# Patient Record
Sex: FEMALE | Race: WHITE | NOT HISPANIC OR LATINO | Employment: FULL TIME | URBAN - METROPOLITAN AREA
[De-identification: names, ages, dates, MRNs, and addresses within clinical notes are randomized per-mention and may not be internally consistent; named-entity substitution may affect disease eponyms.]

---

## 2023-02-24 LAB
CHLAMYDIA TRACH., AMPLIFIED: POSITIVE
HEPATITIS C VIRUS AB PRESENCE IN SERUM: NONREACTIVE
HERPES SIMPLEX VIRUS 1 IGG: <0.2 INDEX
HERPES SIMPLEX VIRUS 2 IGG: <0.2 INDEX
HIV 1/ 2 AG/AB SCREEN: NONREACTIVE
N. GONORRHEA, AMPLIFIED: NEGATIVE
SYPHILIS TOTAL AB: NONREACTIVE
TRICHOMONAS VAGINALIS: NEGATIVE

## 2023-04-12 ENCOUNTER — TELEPHONE (OUTPATIENT)
Dept: PRIMARY CARE | Facility: CLINIC | Age: 28
End: 2023-04-12
Payer: COMMERCIAL

## 2023-04-12 DIAGNOSIS — A74.9 CHLAMYDIA: Primary | ICD-10-CM

## 2023-04-12 NOTE — TELEPHONE ENCOUNTER
Pt seen Feb 23 and we did an STD check, pt was positive for chlamydia and we wanted her to leave another urine sample in 4 weeks. Which urine test needs to be placed? I don't see a urine test from Feb just blood work.

## 2023-04-24 ENCOUNTER — LAB (OUTPATIENT)
Dept: LAB | Facility: LAB | Age: 28
End: 2023-04-24
Payer: COMMERCIAL

## 2023-04-24 DIAGNOSIS — A74.9 CHLAMYDIA: ICD-10-CM

## 2023-04-24 PROCEDURE — 87591 N.GONORRHOEAE DNA AMP PROB: CPT

## 2023-04-24 PROCEDURE — 87491 CHLMYD TRACH DNA AMP PROBE: CPT

## 2023-04-25 ENCOUNTER — TELEPHONE (OUTPATIENT)
Dept: PRIMARY CARE | Facility: CLINIC | Age: 28
End: 2023-04-25
Payer: COMMERCIAL

## 2023-04-25 LAB
CHLAMYDIA TRACH., AMPLIFIED: NEGATIVE
N. GONORRHEA, AMPLIFIED: NEGATIVE

## 2023-04-25 NOTE — TELEPHONE ENCOUNTER
----- Message from April Lopes DO sent at 4/25/2023 12:23 PM EDT -----  Gonorrhea and Chlamydia are both negative    It appears the patient was fully treated appropriately

## 2023-11-27 ENCOUNTER — OFFICE VISIT (OUTPATIENT)
Dept: OBSTETRICS AND GYNECOLOGY | Facility: CLINIC | Age: 28
End: 2023-11-27
Payer: COMMERCIAL

## 2023-11-27 VITALS
DIASTOLIC BLOOD PRESSURE: 80 MMHG | BODY MASS INDEX: 23.16 KG/M2 | SYSTOLIC BLOOD PRESSURE: 110 MMHG | WEIGHT: 130.73 LBS

## 2023-11-27 DIAGNOSIS — Z12.4 SCREENING FOR CERVICAL CANCER: ICD-10-CM

## 2023-11-27 DIAGNOSIS — Z30.41 ENCOUNTER FOR SURVEILLANCE OF CONTRACEPTIVE PILLS: ICD-10-CM

## 2023-11-27 DIAGNOSIS — B37.31 YEAST VAGINITIS: ICD-10-CM

## 2023-11-27 DIAGNOSIS — Z11.51 SCREENING FOR HPV (HUMAN PAPILLOMAVIRUS): ICD-10-CM

## 2023-11-27 DIAGNOSIS — Z32.02 URINE PREGNANCY TEST NEGATIVE: ICD-10-CM

## 2023-11-27 DIAGNOSIS — Z01.419 ENCOUNTER FOR WELL WOMAN EXAM WITH ROUTINE GYNECOLOGICAL EXAM: Primary | ICD-10-CM

## 2023-11-27 DIAGNOSIS — Z11.3 SCREENING FOR STD (SEXUALLY TRANSMITTED DISEASE): ICD-10-CM

## 2023-11-27 LAB — PREGNANCY TEST URINE, POC: NEGATIVE

## 2023-11-27 PROCEDURE — 87800 DETECT AGNT MULT DNA DIREC: CPT

## 2023-11-27 PROCEDURE — 1036F TOBACCO NON-USER: CPT | Performed by: NURSE PRACTITIONER

## 2023-11-27 PROCEDURE — 81025 URINE PREGNANCY TEST: CPT | Performed by: NURSE PRACTITIONER

## 2023-11-27 PROCEDURE — 99395 PREV VISIT EST AGE 18-39: CPT | Performed by: NURSE PRACTITIONER

## 2023-11-27 PROCEDURE — 88142 CYTOPATH C/V THIN LAYER: CPT

## 2023-11-27 RX ORDER — NORETHINDRONE ACETATE AND ETHINYL ESTRADIOL 1MG-20(24)
1 KIT ORAL DAILY
Qty: 84 TABLET | Refills: 3 | Status: SHIPPED | OUTPATIENT
Start: 2023-11-27

## 2023-11-27 RX ORDER — NORETHINDRONE ACETATE AND ETHINYL ESTRADIOL 1MG-20(24)
1 KIT ORAL DAILY
COMMUNITY
End: 2023-11-27 | Stop reason: SDUPTHER

## 2023-11-27 NOTE — PROGRESS NOTES
HPI:   Nell Almodovar is a 28 y.o. who presents today for her annual gynecologic exam without complaints    She has the following concerns; has been having some irregular spotting mid cycle. Had a change in partners, desires STD testing.     GYN HISTORY:  Periods are regular every 28-30 days, lasting 5 days.   Dysmenorrhea:none. Cyclic symptoms include none.   No intermenstrual bleeding, spotting, or discharge.    Current contraception: OCP (estrogen/progesterone)      Requests STD testing: yes -        PAP History   Last pap:   2022 Normal HPV Not done  History of abnormal pap: no    Health Screening  Family history of breast, uterine, ovarian or colon cancer: no         The patient feels safe at home.         Review of Systems:   Constitutional: no fever and no chills.  Cardiovascular: no chest pain.   Respiratory: no shortness of breath.   Gastrointestinal: no nausea, no abdominal pain and no constipation  Genitourinary: no dysuria, no urinary incontinence, no vaginal dryness, no pelvic pain and no vaginal discharge.   Neurological: no headache.  Psychiatric: no anxiety and no depression.              Objective         /80   Wt 59.3 kg (130 lb 11.7 oz)   BMI 23.16 kg/m²         Physical Exam:   Constitutional: Alert and in no acute distress. Well developed, well nourished.      Neck: No neck asymmetry. Supple. Thyroid not enlarged and there were no palpable thyroid nodules.      Cardiovascular: Heart rate and rhythm were normal, normal S1 and S2, no gallops, and no murmurs.      Pulmonary: No respiratory distress. Clear bilateral breath sounds.      Chest: Breasts: Normal appearance, no nipple discharge and no skin changes. Palpation of breasts and axillae: No palpable mass and no axillary lymphadenopathy.      Abdomen: Soft nontender; no abdominal mass palpated. Normal bowel sounds. No organomegaly.      Genitourinary:   - External genitalia: Normal.   - Palpation of lymph nodes in groin: No  inguinal lymphadenopathy.   - Bartholin's Urethral and Skenes Glands: Normal.   - Urethra: Normal.    -Bladder: Normal on palpation.   - Vagina: Normal.   - Cervix: Normal.   - Uterus: Normal. Right Adnexa/parametria: Normal. Left Adnexa/parametria: Normal.   - Perianal Area: Normal.      Skin: Normal skin color and pigmentation, normal skin turgor, and no rash     Psychiatric: Alert and oriented x 3. Affect normal to patient baseline. Mood: Appropriate.            Assessment/Plan       Diagnoses and all orders for this visit:  Encounter for well woman exam with routine gynecological exam  Here for well woman exam. Doing well, though having some irregular spotting mid cycle.   Encounter for surveillance of contraceptive pills  -     Blisovi 24 Fe 1 mg-20 mcg (24)/75 mg (4) tablet; Take 1 tablet by mouth once daily.  Urine pregnancy test negative  -     POCT pregnancy, urine manually resulted  Screening for cervical cancer  -     THINPREP PAP  -     C. Trachomatis / N. Gonorrhoeae, Amplified Detection  Screening for HPV (human papillomavirus)  -     THINPREP PAP  -     C. Trachomatis / N. Gonorrhoeae, Amplified Detection  Screening for STD (sexually transmitted disease)  -     THINPREP PAP  -     C. Trachomatis / N. Gonorrhoeae, Amplified Detection  Follow-up annually, sooner if needed.       PETE Rodrigez-CNP

## 2023-11-28 LAB
C TRACH RRNA SPEC QL NAA+PROBE: NEGATIVE
N GONORRHOEA DNA SPEC QL PROBE+SIG AMP: NEGATIVE

## 2023-12-08 ENCOUNTER — TELEPHONE (OUTPATIENT)
Dept: OBSTETRICS AND GYNECOLOGY | Facility: CLINIC | Age: 28
End: 2023-12-08
Payer: COMMERCIAL

## 2023-12-08 NOTE — TELEPHONE ENCOUNTER
Left message on patient voicemail- swab negative, pap still pending.  Invite to sign up for Mychart sent to cell phone

## 2023-12-26 LAB
CYTOLOGY CMNT CVX/VAG CYTO-IMP: NORMAL
LAB AP CONTRACEPTIVE HISTORY: NORMAL
LAB AP HPV GENOTYPE QUESTION: YES
LAB AP HPV HR: NORMAL
LAB AP PAP ADDITIONAL TESTS: NORMAL
LABORATORY COMMENT REPORT: NORMAL
LABORATORY COMMENT REPORT: NORMAL
PATH REPORT.TOTAL CANCER: NORMAL

## 2023-12-28 RX ORDER — FLUCONAZOLE 150 MG/1
150 TABLET ORAL ONCE
Qty: 2 TABLET | Refills: 0 | Status: SHIPPED | OUTPATIENT
Start: 2023-12-28 | End: 2023-12-28

## 2024-01-14 NOTE — PROGRESS NOTES
Subjective   Patient ID: Nell Almodovar is a 28 y.o. female who presents for annual wellness visit  Today she is accompanied by alone.     HPI  Health maintenance  Overall patient is doing well.  Denies any chest pain, shortness of breath or wheezing with exertion.    Denies any nausea /vomiting diarrhea/constipation.  Denies any urinary symptoms.  Denies any recent visual or hearing changes.  Denies any numbness or tingling in her body, and no dizziness  Immunization: Tdap 2020  Influenza vaccine decline  COVID-19 vaccine (Pfizer Moderna) 2 doses:  Colon Cancer Screening: No family history, will resume screening at age 47  OB/GYN:  Pap smear in 11/2023 was negative with 3 year clearance  Diet: Regular well-balanced diet  Exercise: Exercises regularly 3-4 times a week, with weight lifting.  She is a  as well  Tobacco: Denies use  EtOH: Rarely Socially      2.  ADHD  Patient has a strong family history of ADHD in her mother and her brother.  She states that since college is experiencing episodes of forgetfulness, her level of concentrate is fluctuating, and some days she has no motivation at all to do her chores.  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  She is inquiring to further investigate her condition and possible treatment options      Current Outpatient Medications on File Prior to Visit   Medication Sig Dispense Refill    Blisovi 24 Fe 1 mg-20 mcg (24)/75 mg (4) tablet Take 1 tablet by mouth once daily. 84 tablet 3     No current facility-administered medications on file prior to visit.        Allergies   Allergen Reactions    Amoxicillin Hives     When I was little I think I had a rash       Immunization History   Administered Date(s) Administered    DTaP, Unspecified 10/30/1998    HPV 9-valent vaccine (GARDASIL 9) 08/18/2015, 12/30/2015    HPV, Quadrivalent 07/08/2015    Hepatitis B vaccine,  pediatric/adolescent (RECOMBIVAX, ENGERIX) 07/08/2015, 08/18/2015    Meningococcal MCV4P 07/08/2015    PPD Test 04/29/2015    Pfizer Purple Cap SARS-CoV-2 02/25/2021, 03/18/2021    Tdap vaccine, age 7 year and older (BOOSTRIX) 07/08/2015, 08/24/2020    Varicella vaccine, subcutaneous (VARIVAX) 07/08/2015         Review of Systems  All pertinent positive symptoms are included in the history of present illness.  All other systems have been reviewed and are negative and noncontributory to this patient's current ailments.     Objective   /70 (BP Location: Left arm, Patient Position: Sitting, BP Cuff Size: Adult)   Pulse 67   Wt 58.5 kg (129 lb)   BMI 22.85 kg/m²   BSA: 1.61 meters squared  No visits with results within 1 Month(s) from this visit.   Latest known visit with results is:   Office Visit on 11/27/2023   Component Date Value Ref Range Status    Preg Test, Ur 11/27/2023 Negative  Negative Final    Case Report 11/27/2023    Final                    Value:Gynecologic Cytology                              Case: [id number]                                   Authorizing Provider:  ATA Rodrigez    Collected:           11/27/2023 1341              Ordering Location:     [facility]       Received:            11/27/2023 1341              First Screen:          DELFINA Childress                                                    Rescreen:              DELFINA Brooke                                                          Specimen:    ThinPrep Liquid-Based Pap-Manual Screen, CERVIX, DIAGNOSTIC                                Final Cytological Interpretation 11/27/2023    Final                    Value:This result contains rich text formatting which cannot be displayed here.      11/27/2023    Final                    Value:This result contains rich text formatting which cannot be displayed here.    Educational Note 11/27/2023    Final                    Value:This result contains  rich text formatting which cannot be displayed here.    Perform HPV HR test? 2023 Reflex if ASCUS only   Final    Include HPV Genotype? 2023 Yes   Final    Additional Testin2023 Chlamydia + Gonorrhea   Final    Contraceptive History 2023    Final                    Value:This result contains rich text formatting which cannot be displayed here.    Neisseria gonorrhea,Amplified 2023 Negative  Negative Final    Chlamydia trachomatis, Amplified 2023 Negative  Negative Final       Physical Exam  CONSTITUTIONAL - well nourished, well developed, looks like stated age, in no acute distress  HEAD - no trauma, normocephalic  EYES - pupils are equal and reactive to light, extraocular muscles are intact, and normal external exam  ENT - uvula midline, normal tongue movement, throat without erythema or exudate, nasal passage without discharge and patent  NECK - supple without rigidity, no neck mass was observed, no thyromegaly   CHEST - clear to auscultation, no wheezing, no crackles and no rales, good effort  CARDIAC - regular rhythm and rate, no murmurs, normal S1 and S2  ABDOMEN - no organomegaly, soft, nontender, nondistended, normal bowel sounds, no guarding/rebound/rigidity  EXTREMITIES - no edema, no deformities  NEUROLOGICAL - normal gait, normal balance, normal motor, no ataxia, alert and oriented x3   PSYCHIATRIC - alert, pleasant and cordial, age-appropriate  IMMUNOLOGIC - no cervical lymphadenopathy     Assessment/Plan   Diagnoses and all orders for this visit:  Healthcare maintenance  Complete history and physical examination was performed, with normal finding  Requisition for CBC, CMP, TSH, lipid panel, A1c, and UA was provided today  We will notify of test results once available and make treatment recommendations accordingly     2.  ADHD  Clinical presentation and history are concerning for ADHD.  Referral for neuropsychiatry to have a definite diagnosis was provided  today.  Please call to schedule appointment at your earliest mutual convenience.  If diagnosed with ADHD, considering referral for psychotherapy as the first step.  Considering giving stimulant medication for ADHD as a second step, one of the options being Adderall.    Thank you for letting us be a part of your care team.  Please call the office if you have further questions or concerns regarding your care    Otherwise, please follow-up in 12 months for continued care and your yearly physical examination    Kevin Pendleton MD  PGY1 FM Resident

## 2024-01-15 ENCOUNTER — OFFICE VISIT (OUTPATIENT)
Dept: PRIMARY CARE | Facility: CLINIC | Age: 29
End: 2024-01-15
Payer: COMMERCIAL

## 2024-01-15 VITALS
DIASTOLIC BLOOD PRESSURE: 70 MMHG | BODY MASS INDEX: 22.85 KG/M2 | SYSTOLIC BLOOD PRESSURE: 106 MMHG | WEIGHT: 129 LBS | HEART RATE: 67 BPM

## 2024-01-15 DIAGNOSIS — Z00.00 HEALTHCARE MAINTENANCE: Primary | ICD-10-CM

## 2024-01-15 DIAGNOSIS — F90.9 ATTENTION DEFICIT HYPERACTIVITY DISORDER (ADHD), UNSPECIFIED ADHD TYPE: ICD-10-CM

## 2024-01-15 LAB
POC APPEARANCE, URINE: CLEAR
POC BILIRUBIN, URINE: NEGATIVE
POC BLOOD, URINE: NEGATIVE
POC COLOR, URINE: YELLOW
POC GLUCOSE, URINE: NEGATIVE MG/DL
POC KETONES, URINE: NEGATIVE MG/DL
POC LEUKOCYTES, URINE: NEGATIVE
POC NITRITE,URINE: NEGATIVE
POC PH, URINE: 7.5 PH
POC PROTEIN, URINE: NEGATIVE MG/DL
POC SPECIFIC GRAVITY, URINE: <=1.005
POC UROBILINOGEN, URINE: 0.2 EU/DL

## 2024-01-15 PROCEDURE — 1036F TOBACCO NON-USER: CPT | Performed by: STUDENT IN AN ORGANIZED HEALTH CARE EDUCATION/TRAINING PROGRAM

## 2024-01-15 PROCEDURE — 99395 PREV VISIT EST AGE 18-39: CPT | Performed by: STUDENT IN AN ORGANIZED HEALTH CARE EDUCATION/TRAINING PROGRAM

## 2024-01-15 PROCEDURE — 81002 URINALYSIS NONAUTO W/O SCOPE: CPT | Performed by: STUDENT IN AN ORGANIZED HEALTH CARE EDUCATION/TRAINING PROGRAM

## 2024-01-15 ASSESSMENT — PATIENT HEALTH QUESTIONNAIRE - PHQ9
2. FEELING DOWN, DEPRESSED OR HOPELESS: NOT AT ALL
SUM OF ALL RESPONSES TO PHQ9 QUESTIONS 1 AND 2: 0
1. LITTLE INTEREST OR PLEASURE IN DOING THINGS: NOT AT ALL

## 2024-01-16 ENCOUNTER — OFFICE VISIT (OUTPATIENT)
Dept: DERMATOLOGY | Facility: CLINIC | Age: 29
End: 2024-01-16
Payer: COMMERCIAL

## 2024-01-16 DIAGNOSIS — L81.2 FRECKLES: ICD-10-CM

## 2024-01-16 DIAGNOSIS — Z80.8 FAMILY HISTORY OF NONMELANOMA SKIN CANCER: ICD-10-CM

## 2024-01-16 DIAGNOSIS — D22.9 MULTIPLE BENIGN MELANOCYTIC NEVI: ICD-10-CM

## 2024-01-16 DIAGNOSIS — L21.9 SEBORRHEIC DERMATITIS: ICD-10-CM

## 2024-01-16 DIAGNOSIS — L30.9 DERMATITIS: Primary | ICD-10-CM

## 2024-01-16 PROCEDURE — 1036F TOBACCO NON-USER: CPT | Performed by: DERMATOLOGY

## 2024-01-16 PROCEDURE — 99203 OFFICE O/P NEW LOW 30 MIN: CPT | Performed by: DERMATOLOGY

## 2024-01-16 ASSESSMENT — DERMATOLOGY PATIENT ASSESSMENT
HAVE YOU HAD OR DO YOU HAVE A STAPH INFECTION: NO
DO YOU USE A TANNING BED: NO
ARE YOU ON BIRTH CONTROL: YES
HAVE YOU HAD OR DO YOU HAVE VASCULAR DISEASE: NO
ARE YOU TRYING TO GET PREGNANT: NO
DO YOU HAVE IRREGULAR MENSTRUAL CYCLES: NO
DO YOU USE SUNSCREEN: DAILY
DO YOU HAVE ANY NEW OR CHANGING LESIONS: NO
WHAT TYPE OF BIRTH CONTROL: ORAL
ARE YOU AN ORGAN TRANSPLANT RECIPIENT: NO
DO YOU HAVE ANY NEW OR CHANGING LESIONS: NO

## 2024-01-16 ASSESSMENT — DERMATOLOGY QUALITY OF LIFE (QOL) ASSESSMENT
RATE HOW BOTHERED YOU ARE BY EFFECTS OF YOUR SKIN PROBLEMS ON YOUR ACTIVITIES (EG, GOING OUT, ACCOMPLISHING WHAT YOU WANT, WORK ACTIVITIES OR YOUR RELATIONSHIPS WITH OTHERS): 1
RATE HOW BOTHERED YOU ARE BY EFFECTS OF YOUR SKIN PROBLEMS ON YOUR ACTIVITIES (EG, GOING OUT, ACCOMPLISHING WHAT YOU WANT, WORK ACTIVITIES OR YOUR RELATIONSHIPS WITH OTHERS): 1
RATE HOW BOTHERED YOU ARE BY SYMPTOMS OF YOUR SKIN PROBLEM (EG, ITCHING, STINGING BURNING, HURTING OR SKIN IRRITATION): 1
DID THE PATIENT HAVE A SEVERE MENTAL AND/OR PHYSICAL INCAPACITY THAT PREVENTED HIM OR HER FROM COMPLETING THE ASSESSMENT: NO
RATE HOW EMOTIONALLY BOTHERED YOU ARE BY YOUR SKIN PROBLEM (FOR EXAMPLE, WORRY, EMBARRASSMENT, FRUSTRATION): 0 - NEVER BOTHERED
RATE HOW EMOTIONALLY BOTHERED YOU ARE BY YOUR SKIN PROBLEM (FOR EXAMPLE, WORRY, EMBARRASSMENT, FRUSTRATION): 0 - NEVER BOTHERED
RATE HOW BOTHERED YOU ARE BY SYMPTOMS OF YOUR SKIN PROBLEM (EG, ITCHING, STINGING BURNING, HURTING OR SKIN IRRITATION): 0 - NEVER BOTHERED
WHAT SINGLE SKIN CONDITION LISTED BELOW IS THE PATIENT ANSWERING THE QUALITY-OF-LIFE ASSESSMENT QUESTIONS ABOUT: NONE OF THE ABOVE
RATE HOW EMOTIONALLY BOTHERED YOU ARE BY YOUR SKIN PROBLEM (FOR EXAMPLE, WORRY, EMBARRASSMENT, FRUSTRATION): 0 - NEVER BOTHERED
DID YOU DOCUMENT A PATIENT REASON(S) FOR NOT USING THE QUALITY OF LIFE ASSESSMENT: NO
RATE HOW BOTHERED YOU ARE BY EFFECTS OF YOUR SKIN PROBLEMS ON YOUR ACTIVITIES (EG, GOING OUT, ACCOMPLISHING WHAT YOU WANT, WORK ACTIVITIES OR YOUR RELATIONSHIPS WITH OTHERS): 0 - NEVER BOTHERED
RATE HOW BOTHERED YOU ARE BY SYMPTOMS OF YOUR SKIN PROBLEM (EG, ITCHING, STINGING BURNING, HURTING OR SKIN IRRITATION): 0 - NEVER BOTHERED
RATE HOW BOTHERED YOU ARE BY SYMPTOMS OF YOUR SKIN PROBLEM (EG, ITCHING, STINGING BURNING, HURTING OR SKIN IRRITATION): 1
DATE THE QUALITY-OF-LIFE ASSESSMENT WAS COMPLETED: 66855
WAS THE PATIENT DIAGNOSED WITH A SKIN CONDITION THAT IS INCLUDED IN THE DENOMINATOR DEFINITION (E.G.PSORIASIS, DERMATITIS) BUT IDENTIFIED A SKIN CONDITION THAT IS NOT (E.G. MELANOMA, NEVI) THE MAIN CONDITION ON THEIR ASSESSMENT: NO
ARE THERE EXCLUSIONS OR EXCEPTIONS FOR THE QUALITY OF LIFE ASSESSMENT: NO
RATE HOW EMOTIONALLY BOTHERED YOU ARE BY YOUR SKIN PROBLEM (FOR EXAMPLE, WORRY, EMBARRASSMENT, FRUSTRATION): 0 - NEVER BOTHERED
RATE HOW BOTHERED YOU ARE BY EFFECTS OF YOUR SKIN PROBLEMS ON YOUR ACTIVITIES (EG, GOING OUT, ACCOMPLISHING WHAT YOU WANT, WORK ACTIVITIES OR YOUR RELATIONSHIPS WITH OTHERS): 0 - NEVER BOTHERED
WAS THE PATIENT DIAGNOSED WITH A SKIN CONDITION THAT IS INCLUDED IN THE DENOMINATOR DEFINITION (E.G.PSORIASIS, DERMATITIS) BUT IDENTIFIED A SKIN CONDITION THAT IS NOT (E.G. MELANOMA, NEVI) THE MAIN CONDITION ON THEIR ASSESSMENT: NO
DID THE PATIENT HAVE A SEVERE MENTAL AND/OR PHYSICAL INCAPACITY THAT PREVENTED HIM OR HER FROM COMPLETING THE ASSESSMENT: NO
WHAT SINGLE SKIN CONDITION LISTED BELOW IS THE PATIENT ANSWERING THE QUALITY-OF-LIFE ASSESSMENT QUESTIONS ABOUT: NONE OF THE ABOVE
DID YOU DOCUMENT A PATIENT REASON(S) FOR NOT USING THE QUALITY OF LIFE ASSESSMENT: NO
ARE THERE EXCLUSIONS OR EXCEPTIONS FOR THE QUALITY OF LIFE ASSESSMENT: YES

## 2024-01-16 ASSESSMENT — ITCH NUMERIC RATING SCALE: HOW SEVERE IS YOUR ITCHING?: 0

## 2024-01-16 ASSESSMENT — PATIENT GLOBAL ASSESSMENT (PGA)
PATIENT GLOBAL ASSESSMENT: PATIENT GLOBAL ASSESSMENT:  1 - CLEAR
PATIENT GLOBAL ASSESSMENT: PATIENT GLOBAL ASSESSMENT:  1 - CLEAR
WHAT IS THE PGA: PATIENT GLOBAL ASSESSMENT:  2 - MILD

## 2024-01-16 NOTE — PROGRESS NOTES
Subjective     Nell Almodovar is a 28 y.o. female who presents for the following: Skin Check (History of skin cancers, father, aunt, and paternal grandmother.  Pt h/o eczema and psoriasis.).     Review of Systems:  No other skin or systemic complaints other than what is documented elsewhere in the note.    The following portions of the chart were reviewed this encounter and updated as appropriate:       Specialty Problems    None    Past Medical History:  Nell Almodovar  has a past medical history of Encounter for pregnancy test, result unknown (01/26/2021).    Past Surgical History:  Nell Almodovar  has a past surgical history that includes Other surgical history (04/11/2022).    Family History:  Patient family history includes No Known Problems in her father and mother.    Social History:  Nell Almodovar  reports that she has never smoked. She has never used smokeless tobacco. No history on file for alcohol use and drug use.    Allergies:  Amoxicillin    Current Medications / CAM's:    Current Outpatient Medications:     Blisovi 24 Fe 1 mg-20 mcg (24)/75 mg (4) tablet, Take 1 tablet by mouth once daily., Disp: 84 tablet, Rfl: 3     Objective   Well appearing patient in no apparent distress; mood and affect are within normal limits.    A full examination was performed including scalp, head, eyes, ears, nose, lips, neck, chest, axillae, abdomen, back, buttocks, bilateral upper extremities, bilateral lower extremities, hands, feet, fingers, toes, fingernails, and toenails. All findings within normal limits unless otherwise noted below.    - scattered regular brown macules and papules    Right Thigh - Anterior, Right Upper Back  Thin dermatitic plaques.    Left Cymba, Right Mayelin, Scalp  Erythema with overlying greasy scale.    Head - Anterior (Face), Left Forearm - Anterior, Left Upper Arm - Anterior, Left Upper Back, Right Forearm - Anterior, Right Upper Back  -  extensive round regular tan, uniformly pigmented  macules          Assessment/Plan   Dermatitis  Right Thigh - Anterior; Right Upper Back    -Favor eczematous dermatitis  -Patient is using Cetaphil cream most day and with the plaques being thin and mild she opts for OTC treatment rather than Rx  -Written recommendation given for OTC hydrocortisone 1% cream twice daily  -Patient will call if areas worsen or there are any concerns or want for reevaluation.     Seborrheic dermatitis  Left Cymba; Right Mayelin; Scalp    -Patient opts for OTC options.  -Written recommendations given for Head and Shoulders shampoo to use 2-3 times a week and let sit for 10 minutes before rinsing, if she wants a leave in option, Head and Shoulders royal oils scalp cream, and Vanicream Zbar.  -Patient has an unknown history of scalp psoriasis with no evidence of this today, and I would need to follow for some time to be able to give a definite diagnosis of scalp psoriasis vs. Seborrhea vs. overlap    Freckles (6)  Head - Anterior (Face); Left Upper Arm - Anterior; Left Forearm - Anterior; Right Forearm - Anterior; Left Upper Back; Right Upper Back    - Sun protective behavior reviewed and encouraged including the use of over-the-counter sunscreen with SPF30+ daily (reapply every 1.5 hours when outdoors), UPF clothing, broad brimmed hats, sunglasses, and avoidance of midday sun. Home skin monitoring encouraged and how to monitor for skin cancer (changing or new moles, new rapidly growing or non-healing lesions) reviewed. Patient encouraged to call with interval concerns or changes.    Family history of nonmelanoma skin cancer    Multiple benign melanocytic nevi    Benign melanocytic nevi  - Discussed benign nature and that no treatment is necessary unless it becomes painful or increases in size. Patient opts for clinical monitoring at this time.    - Sun protective behavior reviewed and encouraged including the use of over-the-counter sunscreen with SPF30+ daily (reapply every 1.5 hours when  outdoors), UPF clothing, broad rimmed hats, sunglasses, and avoidance of midday sun. Home skin monitoring encouraged and how to monitor for skin cancer (changing or new moles, new rapidly growing or non-healing lesions) reviewed. Patient encouraged to call with interval concerns or changes.       Megan Bardales - Scribe        Provider Attestation - Scribe documentation    All medical record entries made by the Scribe were at my direction and personally dictated by me. I have reviewed the chart and agree that the record accurately reflects my personal performance of the history, physical exam, discussion and plan.    Karen Hall MD

## 2024-02-07 ENCOUNTER — APPOINTMENT (OUTPATIENT)
Dept: PRIMARY CARE | Facility: CLINIC | Age: 29
End: 2024-02-07
Payer: COMMERCIAL

## 2024-02-08 ENCOUNTER — OFFICE VISIT (OUTPATIENT)
Dept: PRIMARY CARE | Facility: CLINIC | Age: 29
End: 2024-02-08
Payer: COMMERCIAL

## 2024-02-08 VITALS
WEIGHT: 133 LBS | HEART RATE: 64 BPM | BODY MASS INDEX: 23.56 KG/M2 | SYSTOLIC BLOOD PRESSURE: 118 MMHG | DIASTOLIC BLOOD PRESSURE: 70 MMHG

## 2024-02-08 DIAGNOSIS — F90.9 ATTENTION DEFICIT HYPERACTIVITY DISORDER (ADHD), UNSPECIFIED ADHD TYPE: Primary | ICD-10-CM

## 2024-02-08 PROCEDURE — 1036F TOBACCO NON-USER: CPT | Performed by: STUDENT IN AN ORGANIZED HEALTH CARE EDUCATION/TRAINING PROGRAM

## 2024-02-08 PROCEDURE — 99214 OFFICE O/P EST MOD 30 MIN: CPT | Performed by: STUDENT IN AN ORGANIZED HEALTH CARE EDUCATION/TRAINING PROGRAM

## 2024-02-08 RX ORDER — DEXTROAMPHETAMINE SACCHARATE, AMPHETAMINE ASPARTATE MONOHYDRATE, DEXTROAMPHETAMINE SULFATE AND AMPHETAMINE SULFATE 2.5; 2.5; 2.5; 2.5 MG/1; MG/1; MG/1; MG/1
10 CAPSULE, EXTENDED RELEASE ORAL EVERY MORNING
Qty: 30 CAPSULE | Refills: 0 | Status: SHIPPED | OUTPATIENT
Start: 2024-02-08 | End: 2024-03-18 | Stop reason: SDUPTHER

## 2024-02-08 NOTE — PROGRESS NOTES
Subjective   Patient ID: Nell Almodovar is a 28 y.o. female who presents for annual wellness visit  Today she is accompanied by alone.     HPI  1.  ADHD  Patient was seen in this clinic one month ago to discuss increasingly frequent episodes of forgetfulness, decreased motivation, and fluctuating concentration  Given symptoms and strong family history of ADHD in her mother and brother, she was provided with a referral to neuropsychiatry to get tested  She was seen by Dr. Stewart on 01/20/24 and found to have ADHD, predominantly inattentive type, with recommendation for trial with stimulant medication  Today, patient is interested in starting a stimulant for symptoms and is wondering if there are any side effects and how the medication works  Denies chest pain, palpitations, or anxiety    Current Outpatient Medications on File Prior to Visit   Medication Sig Dispense Refill    Blisovi 24 Fe 1 mg-20 mcg (24)/75 mg (4) tablet Take 1 tablet by mouth once daily. 84 tablet 3     No current facility-administered medications on file prior to visit.        Allergies   Allergen Reactions    Amoxicillin Hives     When I was little I think I had a rash       Immunization History   Administered Date(s) Administered    DTaP, Unspecified 10/30/1998    HPV 9-valent vaccine (GARDASIL 9) 08/18/2015, 12/30/2015    HPV, Quadrivalent 07/08/2015    Hepatitis B vaccine, pediatric/adolescent (RECOMBIVAX, ENGERIX) 07/08/2015, 08/18/2015    Meningococcal MCV4P 07/08/2015    PPD Test 04/29/2015    Pfizer Purple Cap SARS-CoV-2 02/25/2021, 03/18/2021    Tdap vaccine, age 7 year and older (BOOSTRIX, ADACEL) 07/08/2015, 08/24/2020    Varicella vaccine, subcutaneous (VARIVAX) 07/08/2015         Review of Systems  All pertinent positive symptoms are included in the history of present illness.  All other systems have been reviewed and are negative and noncontributory to this patient's current ailments.     Objective   /70 (BP Location: Left  arm, Patient Position: Sitting, BP Cuff Size: Adult)   Pulse 64   Wt 60.3 kg (133 lb)   BMI 23.56 kg/m²   BSA: 1.64 meters squared  Office Visit on 01/15/2024   Component Date Value Ref Range Status    POC Color, Urine 01/15/2024 Yellow  Straw, Yellow, Light-Yellow Final    POC Appearance, Urine 01/15/2024 Clear  Clear Final    POC Glucose, Urine 01/15/2024 NEGATIVE  NEGATIVE mg/dl Final    POC Bilirubin, Urine 01/15/2024 NEGATIVE  NEGATIVE Final    POC Ketones, Urine 01/15/2024 NEGATIVE  NEGATIVE mg/dl Final    POC Specific Gravity, Urine 01/15/2024 <=1.005  1.005 - 1.035 Final    POC Blood, Urine 01/15/2024 NEGATIVE  NEGATIVE Final    POC PH, Urine 01/15/2024 7.5  No Reference Range Established PH Final    POC Protein, Urine 01/15/2024 NEGATIVE  NEGATIVE, 30 (1+) mg/dl Final    POC Urobilinogen, Urine 01/15/2024 0.2  0.2, 1.0 EU/DL Final    Poc Nitrite, Urine 01/15/2024 NEGATIVE  NEGATIVE Final    POC Leukocytes, Urine 01/15/2024 NEGATIVE  NEGATIVE Final       Physical Exam  CONSTITUTIONAL - well nourished, well developed, looks like stated age, in no acute distress  HEAD - no trauma, normocephalic  EYES - pupils are equal and reactive to light, extraocular muscles are intact, and normal external exam  ENT - uvula midline, normal tongue movement, throat without erythema or exudate, nasal passage without discharge and patent  NECK - supple without rigidity, no neck mass was observed, no thyromegaly   CHEST - clear to auscultation, no wheezing, no crackles and no rales, good effort  CARDIAC - regular rhythm and rate, no murmurs, normal S1 and S2  EXTREMITIES - no edema, no deformities  NEUROLOGICAL - normal gait, normal balance, normal motor, no ataxia, alert and oriented x3   PSYCHIATRIC - alert, pleasant and cordial, age-appropriate    Assessment/Plan   Diagnoses and all orders for this visit:    1.  ADHD  We had a long discussion about your newly diagnosed ADHD and the meaning of your testing  We discussed  side effects of Adderall XR 10 mg and how this medication works; all questions answered  I have sent this prescription over to your preferred pharmacy  UDS/CSA completed in office today  Please call in one month to let me know how you are feeling and return to the clinic in 3 months for follow up      Thank you for letting us be a part of your care team.  Please call the office if you have further questions or concerns regarding your care    Otherwise, please follow-up in 12 months for continued care and your yearly physical examination

## 2024-02-09 LAB
AMPHETAMINES UR QL SCN: NORMAL
BARBITURATES UR QL SCN: NORMAL
BENZODIAZ UR QL SCN: NORMAL
BZE UR QL SCN: NORMAL
CANNABINOIDS UR QL SCN: NORMAL
FENTANYL+NORFENTANYL UR QL SCN: NORMAL
OPIATES UR QL SCN: NORMAL
OXYCODONE+OXYMORPHONE UR QL SCN: NORMAL
PCP UR QL SCN: NORMAL

## 2024-02-09 PROCEDURE — 80307 DRUG TEST PRSMV CHEM ANLYZR: CPT

## 2024-02-09 PROCEDURE — 80324 DRUG SCREEN AMPHETAMINES 1/2: CPT

## 2024-02-14 LAB
AMPHET UR-MCNC: <50 NG/ML
MDA UR-MCNC: <200 NG/ML
MDEA UR-MCNC: <200 NG/ML
MDMA UR-MCNC: <200 NG/ML
METHAMPHET UR-MCNC: <200 NG/ML
PHENTERMINE UR CFM-MCNC: <200 NG/ML

## 2024-03-18 ENCOUNTER — OFFICE VISIT (OUTPATIENT)
Dept: PRIMARY CARE | Facility: CLINIC | Age: 29
End: 2024-03-18
Payer: COMMERCIAL

## 2024-03-18 VITALS
WEIGHT: 131 LBS | SYSTOLIC BLOOD PRESSURE: 122 MMHG | HEART RATE: 51 BPM | DIASTOLIC BLOOD PRESSURE: 80 MMHG | OXYGEN SATURATION: 99 % | BODY MASS INDEX: 23.21 KG/M2

## 2024-03-18 DIAGNOSIS — F90.9 ATTENTION DEFICIT HYPERACTIVITY DISORDER (ADHD), UNSPECIFIED ADHD TYPE: ICD-10-CM

## 2024-03-18 PROCEDURE — 99213 OFFICE O/P EST LOW 20 MIN: CPT | Performed by: STUDENT IN AN ORGANIZED HEALTH CARE EDUCATION/TRAINING PROGRAM

## 2024-03-18 PROCEDURE — 1036F TOBACCO NON-USER: CPT | Performed by: STUDENT IN AN ORGANIZED HEALTH CARE EDUCATION/TRAINING PROGRAM

## 2024-03-18 RX ORDER — DEXTROAMPHETAMINE SACCHARATE, AMPHETAMINE ASPARTATE MONOHYDRATE, DEXTROAMPHETAMINE SULFATE AND AMPHETAMINE SULFATE 2.5; 2.5; 2.5; 2.5 MG/1; MG/1; MG/1; MG/1
10 CAPSULE, EXTENDED RELEASE ORAL EVERY MORNING
Qty: 30 CAPSULE | Refills: 0 | Status: SHIPPED | OUTPATIENT
Start: 2024-03-18 | End: 2024-06-10 | Stop reason: SDUPTHER

## 2024-03-18 RX ORDER — DEXTROAMPHETAMINE SACCHARATE, AMPHETAMINE ASPARTATE MONOHYDRATE, DEXTROAMPHETAMINE SULFATE AND AMPHETAMINE SULFATE 2.5; 2.5; 2.5; 2.5 MG/1; MG/1; MG/1; MG/1
10 CAPSULE, EXTENDED RELEASE ORAL EVERY MORNING
Qty: 30 CAPSULE | Refills: 0 | Status: SHIPPED | OUTPATIENT
Start: 2024-04-17 | End: 2024-06-10 | Stop reason: SDUPTHER

## 2024-03-18 RX ORDER — DEXTROAMPHETAMINE SACCHARATE, AMPHETAMINE ASPARTATE MONOHYDRATE, DEXTROAMPHETAMINE SULFATE AND AMPHETAMINE SULFATE 2.5; 2.5; 2.5; 2.5 MG/1; MG/1; MG/1; MG/1
10 CAPSULE, EXTENDED RELEASE ORAL EVERY MORNING
Qty: 30 CAPSULE | Refills: 0 | Status: SHIPPED | OUTPATIENT
Start: 2024-05-17 | End: 2024-06-10 | Stop reason: SDUPTHER

## 2024-03-18 NOTE — PROGRESS NOTES
Subjective   Patient ID: Nell Almodovar is a 28 y.o. female who presents for ADHD.  Today she is accompanied by alone.     HPI    1.  ADHD  Patient was seen in this clinic one month ago to discuss increasingly frequent episodes of forgetfulness, decreased motivation, and fluctuating concentration  Given symptoms and strong family history of ADHD in her mother and brother, she was provided with a referral to neuropsychiatry to get tested  She was seen by Dr. Stewart on 01/20/24 and found to have ADHD, predominantly inattentive type, with recommendation for trial with stimulant medication  Last visit we started patient on 10 mg Adderall   She had some chest tightness for the first few days of starting the medication, but that has since resolved and she is happy with this medication, and would like to continue on this dose  Denies chest pain, palpitations, or anxiety  She drinks 1 cup of coffee a day    Current Outpatient Medications on File Prior to Visit   Medication Sig Dispense Refill    Blisovi 24 Fe 1 mg-20 mcg (24)/75 mg (4) tablet Take 1 tablet by mouth once daily. 84 tablet 3    [DISCONTINUED] amphetamine-dextroamphetamine XR (Adderall XR) 10 mg 24 hr capsule Take 1 capsule (10 mg) by mouth once daily in the morning. Do not crush or chew. 30 capsule 0     No current facility-administered medications on file prior to visit.        Allergies   Allergen Reactions    Amoxicillin Hives     When I was little I think I had a rash       Immunization History   Administered Date(s) Administered    DTaP, Unspecified 10/30/1998    HPV 9-valent vaccine (GARDASIL 9) 08/18/2015, 12/30/2015    HPV, Quadrivalent 07/08/2015    Hepatitis B vaccine, pediatric/adolescent (RECOMBIVAX, ENGERIX) 07/08/2015, 08/18/2015    Meningococcal MCV4P 07/08/2015    PPD Test 04/29/2015    Pfizer Purple Cap SARS-CoV-2 02/25/2021, 03/18/2021    Tdap vaccine, age 7 year and older (BOOSTRIX, ADACEL) 07/08/2015, 08/24/2020    Varicella vaccine,  subcutaneous (VARIVAX) 07/08/2015         Review of Systems  All pertinent positive symptoms are included in the history of present illness.  All other systems have been reviewed and are negative and noncontributory to this patient's current ailments.     Objective   /80 (BP Location: Left arm, Patient Position: Sitting, BP Cuff Size: Adult)   Pulse 51   Wt 59.4 kg (131 lb)   SpO2 99%   BMI 23.21 kg/m²   BSA: 1.62 meters squared  No visits with results within 1 Month(s) from this visit.   Latest known visit with results is:   Office Visit on 02/08/2024   Component Date Value Ref Range Status    Amphetamine Screen, Urine 02/09/2024 Presumptive Negative  Presumptive Negative Final    CUTOFF LEVEL: 500 NG/ML   Cross-reactivity has been reported with high concentrations   of the following drugs: buproprion, chloroquine, chlorpromazine,   ephedrine, mephentermine, fenfluramine, phentermine,   phenylpropanolamine, pseudoephedrine, and propranolol.    Barbiturate Screen, Urine 02/09/2024 Presumptive Negative  Presumptive Negative Final    CUTOFF LEVEL: 200 NG/ML    Benzodiazepines Screen, Urine 02/09/2024 Presumptive Negative  Presumptive Negative Final    CUTOFF LEVEL: 200 NG/ML    Cannabinoid Screen, Urine 02/09/2024 Presumptive Negative  Presumptive Negative Final    CUTOFF LEVEL: 50 NG/ML    Cocaine Metabolite Screen, Urine 02/09/2024 Presumptive Negative  Presumptive Negative Final    CUTOFF LEVEL: 150 NG/ML    Fentanyl Screen, Urine 02/09/2024 Presumptive Negative  Presumptive Negative Final    CUTOFF LEVEL: 5 NG/ML    Opiate Screen, Urine 02/09/2024 Presumptive Negative  Presumptive Negative Final    CUTOFF LEVEL: 300 NG/ML  The opiate screen does not detect fentanyl, meperidine, or   tramadol. Oxycodone is not consistently detected (refer to  Oxycodone Screen, Urine result).    Oxycodone Screen, Urine 02/09/2024 Presumptive Negative  Presumptive Negative Final    CUTOFF LEVEL: 100 NG/ML  This test will  accurately detect both oxycodone and oxymorphone.    PCP Screen, Urine 02/09/2024 Presumptive Negative  Presumptive Negative Final    CUTOFF LEVEL:  25 NG/ML  Cross-reactivity has been reported with dextromethorphan.    Methamphetamine Quant, Ur 02/09/2024 <200  ng/mL Final    MDA, Urine 02/09/2024 <200  ng/mL Final    MDEA, Urine 02/09/2024 <200  ng/mL Final    Phentermine,Urine 02/09/2024 <200  ng/mL Final    Performed By: [facility]  [address] [place] [id number]  : Agapito Chappell MD, PhD  CLIA Number: [id number]    Amphetamines,Urine 02/09/2024 <50  ng/mL Final    INTERPRETIVE INFORMATION: Amphetamines, Urine,                             Quantitative    Methodology: Quantitative Liquid Chromatography-Tandem Mass   Spectrometry    Positive cutoff: 200 ng/mL unless specified below:  Amphetamine     50 ng/mL    For medical purposes only; not valid for forensic use.     The absence of expected drug(s) and/or drug metabolite(s) may   indicate non-compliance, inappropriate timing of specimen   collection relative to drug administration, poor drug absorption,   diluted/adulterated urine, or limitations of testing. The   concentration value must be greater than or equal to the cutoff to   be reported as positive. Interpretive questions should be directed   to the laboratory.    This test was developed and its performance characteristics   determined by [facility]. It has not been cleared or   approved by the US Food and Drug Administration. This test was   performed in a CLIA certified laboratory and is intended for   clinical purposes.    MDMA, Urine 02/09/2024 <200  ng/mL Final       Physical Exam  CONSTITUTIONAL - well nourished, well developed, looks like stated age, in no acute distress, not ill-appearing, and not tired appearing  SKIN - normal skin color and pigmentation, normal skin turgor without rash, lesions, or nodules visualized  HEAD - no trauma,  normocephalic  EYES - normal external exam  CHEST -no distressed breathing, good effort  EXTREMITIES - no edema, no deformities  NEUROLOGICAL - normal balance, normal motor, no ataxia  PSYCHIATRIC - alert, pleasant and cordial, age-appropriate    Assessment/Plan     1.  ADHD  Controlled on 10 mg Adderall, requesting refills  I have sent this prescription over to your preferred pharmacy  UDS/CSA completed on 2/2024    Please call the office with any questions/concerns. Please follow up in 3 months for medication refill.

## 2024-03-29 ENCOUNTER — LAB (OUTPATIENT)
Dept: LAB | Facility: LAB | Age: 29
End: 2024-03-29
Payer: COMMERCIAL

## 2024-03-29 DIAGNOSIS — Z00.00 HEALTHCARE MAINTENANCE: ICD-10-CM

## 2024-03-29 LAB
ALBUMIN SERPL BCP-MCNC: 4.1 G/DL (ref 3.4–5)
ALP SERPL-CCNC: 40 U/L (ref 33–110)
ALT SERPL W P-5'-P-CCNC: 12 U/L (ref 7–45)
ANION GAP SERPL CALC-SCNC: 10 MMOL/L (ref 10–20)
AST SERPL W P-5'-P-CCNC: 16 U/L (ref 9–39)
BASOPHILS # BLD AUTO: 0.07 X10*3/UL (ref 0–0.1)
BASOPHILS NFR BLD AUTO: 1 %
BILIRUB SERPL-MCNC: 0.5 MG/DL (ref 0–1.2)
BUN SERPL-MCNC: 12 MG/DL (ref 6–23)
CALCIUM SERPL-MCNC: 8.9 MG/DL (ref 8.6–10.3)
CHLORIDE SERPL-SCNC: 103 MMOL/L (ref 98–107)
CHOLEST SERPL-MCNC: 155 MG/DL (ref 0–199)
CHOLESTEROL/HDL RATIO: 2.3
CO2 SERPL-SCNC: 28 MMOL/L (ref 21–32)
CREAT SERPL-MCNC: 0.85 MG/DL (ref 0.5–1.05)
EGFRCR SERPLBLD CKD-EPI 2021: >90 ML/MIN/1.73M*2
EOSINOPHIL # BLD AUTO: 0.08 X10*3/UL (ref 0–0.7)
EOSINOPHIL NFR BLD AUTO: 1.1 %
ERYTHROCYTE [DISTWIDTH] IN BLOOD BY AUTOMATED COUNT: 12.2 % (ref 11.5–14.5)
GLUCOSE SERPL-MCNC: 78 MG/DL (ref 74–99)
HCT VFR BLD AUTO: 44.5 % (ref 36–46)
HDLC SERPL-MCNC: 67.2 MG/DL
HGB BLD-MCNC: 14.6 G/DL (ref 12–16)
IMM GRANULOCYTES # BLD AUTO: 0.03 X10*3/UL (ref 0–0.7)
IMM GRANULOCYTES NFR BLD AUTO: 0.4 % (ref 0–0.9)
LDLC SERPL CALC-MCNC: 76 MG/DL
LYMPHOCYTES # BLD AUTO: 2.26 X10*3/UL (ref 1.2–4.8)
LYMPHOCYTES NFR BLD AUTO: 31.5 %
MCH RBC QN AUTO: 29.7 PG (ref 26–34)
MCHC RBC AUTO-ENTMCNC: 32.8 G/DL (ref 32–36)
MCV RBC AUTO: 90 FL (ref 80–100)
MONOCYTES # BLD AUTO: 0.5 X10*3/UL (ref 0.1–1)
MONOCYTES NFR BLD AUTO: 7 %
NEUTROPHILS # BLD AUTO: 4.24 X10*3/UL (ref 1.2–7.7)
NEUTROPHILS NFR BLD AUTO: 59 %
NON HDL CHOLESTEROL: 88 MG/DL (ref 0–149)
NRBC BLD-RTO: 0 /100 WBCS (ref 0–0)
PLATELET # BLD AUTO: 323 X10*3/UL (ref 150–450)
POTASSIUM SERPL-SCNC: 3.7 MMOL/L (ref 3.5–5.3)
PROT SERPL-MCNC: 6.9 G/DL (ref 6.4–8.2)
RBC # BLD AUTO: 4.92 X10*6/UL (ref 4–5.2)
SODIUM SERPL-SCNC: 137 MMOL/L (ref 136–145)
TRIGL SERPL-MCNC: 58 MG/DL (ref 0–149)
TSH SERPL-ACNC: 1.63 MIU/L (ref 0.44–3.98)
VLDL: 12 MG/DL (ref 0–40)
WBC # BLD AUTO: 7.2 X10*3/UL (ref 4.4–11.3)

## 2024-03-29 PROCEDURE — 80061 LIPID PANEL: CPT

## 2024-03-29 PROCEDURE — 80053 COMPREHEN METABOLIC PANEL: CPT

## 2024-03-29 PROCEDURE — 83036 HEMOGLOBIN GLYCOSYLATED A1C: CPT

## 2024-03-29 PROCEDURE — 85025 COMPLETE CBC W/AUTO DIFF WBC: CPT

## 2024-03-29 PROCEDURE — 84443 ASSAY THYROID STIM HORMONE: CPT

## 2024-03-29 PROCEDURE — 36415 COLL VENOUS BLD VENIPUNCTURE: CPT

## 2024-03-30 LAB
EST. AVERAGE GLUCOSE BLD GHB EST-MCNC: 126 MG/DL
HBA1C MFR BLD: 6 %

## 2024-03-31 NOTE — RESULT ENCOUNTER NOTE
Hemoglobin A1c does show prediabetes at 6.0%  I recommend diet and exercise and we will continue monitoring closely    Cholesterol looks great at 155, HDL 67, LDL 76, triglycerides 58    Fasting sugar, kidneys, liver, electrolytes are all within normal limits    Thyroid well within normal limits    Complete blood cell count shows no anemia    Keep up the great work and we will continue monitoring the sugar closely

## 2024-04-01 ENCOUNTER — OFFICE VISIT (OUTPATIENT)
Dept: DERMATOLOGY | Facility: CLINIC | Age: 29
End: 2024-04-01
Payer: COMMERCIAL

## 2024-04-01 DIAGNOSIS — L57.8 DIFFUSE PHOTODAMAGE OF SKIN: ICD-10-CM

## 2024-04-01 DIAGNOSIS — L73.9 FOLLICULITIS: ICD-10-CM

## 2024-04-01 DIAGNOSIS — B08.1 MOLLUSCUM CONTAGIOSUM: Primary | ICD-10-CM

## 2024-04-01 PROCEDURE — 99214 OFFICE O/P EST MOD 30 MIN: CPT | Performed by: DERMATOLOGY

## 2024-04-01 PROCEDURE — 17110 DESTRUCTION B9 LES UP TO 14: CPT

## 2024-04-01 RX ORDER — CLINDAMYCIN PHOSPHATE 10 UG/ML
1 LOTION TOPICAL 2 TIMES DAILY
Qty: 60 ML | Refills: 11 | Status: SHIPPED | OUTPATIENT
Start: 2024-04-01

## 2024-04-01 NOTE — PROGRESS NOTES
Subjective     Nell Almodovar is a 28 y.o. female who presents for the following: Suspicious Skin Lesion (On pubic area and thighs).  She reports bumps in her bikini line, which have been present for several weeks.  After the bumps appeared, she tried to avoid shaving the area, but she is not sure if that helped.  She notes intermittent ingrown hairs and pimples in her pubic area, but these lesions have not gone away.  She reports one of the lesions became a swollen bump recently, and she tried warm compresses and hydrogen peroxide.       Review of Systems:  No other skin or systemic complaints other than what is documented elsewhere in the note.    The following portions of the chart were reviewed this encounter and updated as appropriate:       Skin Cancer History  No skin cancer on file.    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 Do not crush or chew., Disp: 30 capsule, Rfl: 0    [START ON 4/17/2024] amphetamine-dextroamphetamine XR (Adderall XR) 10 mg 24 hr capsule, Take 1 capsule (10 mg) by mouth once daily in the morning. Do not crush or chew. Do not start before April 17, 2024., Disp: 30 capsule, Rfl: 0    [START ON 5/17/2024] amphetamine-dextroamphetamine XR (Adderall XR) 10 mg 24 hr capsule, Take 1 capsule (10 mg) by mouth once daily in the morning. Do not crush or chew. Do not start before May 17, 2024., Disp: 30 capsule, Rfl: 0    Blisovi 24 Fe 1 mg-20 mcg (24)/75 mg (4) tablet, Take 1 tablet by mouth  once daily., Disp: 84 tablet, Rfl: 3    clindamycin (Cleocin T) 1 % lotion, Apply 1 Application topically 2 times a day. For ingrown hairs, Disp: 60 mL, Rfl: 11     Objective   Well appearing patient in no apparent distress; mood and affect are within normal limits.    A skin examination was performed including: Face, neck, pubic area, and bilateral thighs. All findings within normal limits unless otherwise noted below.    Assessment/Plan   1. Molluscum contagiosum  left medial proximal thigh, left pubis, right medial proximal thigh (2), right pubis (3)  Scattered on the patient's left medial proximal thigh (1), left pubic area (1), right medial proximal thigh (2), and right pubic area (3), there are several similar-appearing small, pink, round, shiny papules each with central umbilication.    Molluscum Contagiosum - left medial proximal thigh, left pubic area, right medial proximal thigh, and right pubic area.  The viral nature of these lesions, their potentially communicable nature via skin-to-skin contact, and treatment options were discussed extensively with the patient today.  After discussing the risks, benefits, and side effects of various treatment options, including no treatment at all, as these lesions will eventually self-resolve, topical therapy, such as with a topical retinoid, and destructive treatments, such as curettage, topical therapy with Cantharidin, as well as liquid nitrogen cryotherapy and possible permanent hypo- and/or hyperpigmentation and/or scarring following destructive treatments, the patient expressed understanding and wishes to proceed with cryotherapy today.  Should any of the lesions not resolve within 2-3 weeks following treatment today or they notice any new or similar lesions in the future, the patient was advised to call our office to schedule a return visit for re-evaluation and possible repeat or further treatment if indicated at that time.  She expressed understanding, is in  agreement with this plan, and wishes to proceed with cryotherapy today.    Destr of lesion - left medial proximal thigh, left pubis, right medial proximal thigh, right pubis    Destruction method: cryotherapy    Informed consent: discussed and consent obtained    Lesion destroyed using liquid nitrogen: Yes    Cryotherapy cycles:  2  Outcome: patient tolerated procedure well with no complications      Related Medications  clindamycin (Cleocin T) 1 % lotion  Apply 1 Application topically 2 times a day. For ingrown hairs    2. Folliculitis  Pubic  Scattered on the patient's pubic area, there are several follicular-based erythematous, inflammatory papules and pustules    Folliculitis -pubic area.  The bacterial nature of this condition and treatment options were discussed with the patient today.  At this time, we recommend topical antibiotic therapy with Clindamycin 1% lotion, which the patient was instructed to apply twice daily to the affected areas or up to 3-4 times per day as needed for active lesions.  The risks, benefits, and side effects of this medication were discussed.  The patient expressed understanding and is in agreement with this plan.    3. Diffuse photodamage of skin  Photodistributed  Diffuse photodamage with actinic changes with telangiectasia and mottled pigmentation in sun-exposed areas.    Photodamage.  The signs and symptoms of skin cancer were reviewed and the patient was advised to practice sun protection and sun avoidance, use daily sunscreen, and perform regular self skin exams.  Sun protection was discussed, including avoiding the mid-day sun, wearing a sunscreen with SPF at least 50, and stressing the need for reapplication of sunscreen and applying more than they think they need.        Seen and discussed with attending physician Dr. Hillary Murrieta MD, PhD  Resident, Dermatology       I saw and evaluated the patient. I personally obtained the key and critical portions of the  history and physical exam or was physically present for key and critical portions performed by the resident/fellow. I reviewed the resident/fellow's documentation and discussed the patient with the resident/fellow. I agree with the resident/fellow's medical decision making as documented in the note.    Abel Thornton MD

## 2024-06-10 ENCOUNTER — LAB (OUTPATIENT)
Dept: LAB | Facility: LAB | Age: 29
End: 2024-06-10
Payer: COMMERCIAL

## 2024-06-10 ENCOUNTER — OFFICE VISIT (OUTPATIENT)
Dept: PRIMARY CARE | Facility: CLINIC | Age: 29
End: 2024-06-10
Payer: COMMERCIAL

## 2024-06-10 VITALS
SYSTOLIC BLOOD PRESSURE: 118 MMHG | OXYGEN SATURATION: 98 % | HEART RATE: 82 BPM | WEIGHT: 129 LBS | DIASTOLIC BLOOD PRESSURE: 80 MMHG | BODY MASS INDEX: 22.85 KG/M2

## 2024-06-10 DIAGNOSIS — F90.9 ATTENTION DEFICIT HYPERACTIVITY DISORDER (ADHD), UNSPECIFIED ADHD TYPE: ICD-10-CM

## 2024-06-10 DIAGNOSIS — R73.03 PREDIABETES: Primary | ICD-10-CM

## 2024-06-10 DIAGNOSIS — R73.03 PREDIABETES: ICD-10-CM

## 2024-06-10 PROCEDURE — 36415 COLL VENOUS BLD VENIPUNCTURE: CPT

## 2024-06-10 PROCEDURE — 1036F TOBACCO NON-USER: CPT | Performed by: STUDENT IN AN ORGANIZED HEALTH CARE EDUCATION/TRAINING PROGRAM

## 2024-06-10 PROCEDURE — 83036 HEMOGLOBIN GLYCOSYLATED A1C: CPT

## 2024-06-10 PROCEDURE — 99213 OFFICE O/P EST LOW 20 MIN: CPT | Performed by: STUDENT IN AN ORGANIZED HEALTH CARE EDUCATION/TRAINING PROGRAM

## 2024-06-10 RX ORDER — DEXTROAMPHETAMINE SACCHARATE, AMPHETAMINE ASPARTATE MONOHYDRATE, DEXTROAMPHETAMINE SULFATE AND AMPHETAMINE SULFATE 2.5; 2.5; 2.5; 2.5 MG/1; MG/1; MG/1; MG/1
10 CAPSULE, EXTENDED RELEASE ORAL EVERY MORNING
Qty: 30 CAPSULE | Refills: 0 | Status: SHIPPED | OUTPATIENT
Start: 2024-06-10 | End: 2024-07-10

## 2024-06-10 RX ORDER — DEXTROAMPHETAMINE SACCHARATE, AMPHETAMINE ASPARTATE MONOHYDRATE, DEXTROAMPHETAMINE SULFATE AND AMPHETAMINE SULFATE 2.5; 2.5; 2.5; 2.5 MG/1; MG/1; MG/1; MG/1
10 CAPSULE, EXTENDED RELEASE ORAL EVERY MORNING
Qty: 30 CAPSULE | Refills: 0 | Status: SHIPPED | OUTPATIENT
Start: 2024-08-09 | End: 2024-09-08

## 2024-06-10 RX ORDER — DEXTROAMPHETAMINE SACCHARATE, AMPHETAMINE ASPARTATE MONOHYDRATE, DEXTROAMPHETAMINE SULFATE AND AMPHETAMINE SULFATE 2.5; 2.5; 2.5; 2.5 MG/1; MG/1; MG/1; MG/1
10 CAPSULE, EXTENDED RELEASE ORAL EVERY MORNING
Qty: 30 CAPSULE | Refills: 0 | Status: SHIPPED | OUTPATIENT
Start: 2024-07-10 | End: 2024-08-09

## 2024-06-10 ASSESSMENT — PATIENT HEALTH QUESTIONNAIRE - PHQ9
SUM OF ALL RESPONSES TO PHQ9 QUESTIONS 1 AND 2: 0
1. LITTLE INTEREST OR PLEASURE IN DOING THINGS: NOT AT ALL
2. FEELING DOWN, DEPRESSED OR HOPELESS: NOT AT ALL

## 2024-06-10 NOTE — PROGRESS NOTES
Subjective   Patient ID: Nell Almodovar is a 29 y.o. female who presents for ADHD.  Today she is accompanied by alone.     HPI    1.  ADHD  Patient was seen by Dr. Stewart on 01/20/24 and found to have ADHD, predominantly inattentive type, with recommendation for trial with stimulant medication  We started her on Adderall 10 mg daily as indicated the chart  She has now been on this medication for almost 5/6 months and feels well  Ultimately feels better with this dosage and requesting refills  UDS/CSA up-to-date from earlier this year    Wishes to continue the current dosage without any changes    Denies any other signs/symptoms including chest pain, palpitations, or increased anxiety    2.  Prediabetes  Her last hemoglobin A1c is at 6%  Denies any polyuria/polydipsia  Willing and wishing get this repeated      Current Outpatient Medications on File Prior to Visit   Medication Sig Dispense Refill    amphetamine-dextroamphetamine XR (Adderall XR) 10 mg 24 hr capsule Take 1 capsule (10 mg) by mouth once daily in the morning. Do not crush or chew. 30 capsule 0    amphetamine-dextroamphetamine XR (Adderall XR) 10 mg 24 hr capsule Take 1 capsule (10 mg) by mouth once daily in the morning. Do not crush or chew. Do not start before April 17, 2024. 30 capsule 0    amphetamine-dextroamphetamine XR (Adderall XR) 10 mg 24 hr capsule Take 1 capsule (10 mg) by mouth once daily in the morning. Do not crush or chew. Do not start before May 17, 2024. 30 capsule 0    Blisovi 24 Fe 1 mg-20 mcg (24)/75 mg (4) tablet Take 1 tablet by mouth once daily. 84 tablet 3    clindamycin (Cleocin T) 1 % lotion Apply 1 Application topically 2 times a day. For ingrown hairs 60 mL 11     No current facility-administered medications on file prior to visit.        Allergies   Allergen Reactions    Amoxicillin Hives     When I was little I think I had a rash       Immunization History   Administered Date(s) Administered    DTaP, Unspecified 10/30/1998     HPV 9-valent vaccine (GARDASIL 9) 08/18/2015, 12/30/2015    HPV, Quadrivalent 07/08/2015    Hepatitis B vaccine, pediatric/adolescent (RECOMBIVAX, ENGERIX) 07/08/2015, 08/18/2015    Meningococcal ACWY-D (Menactra) 4-valent conjugate vaccine 07/08/2015    PPD Test 04/29/2015    Pfizer Purple Cap SARS-CoV-2 02/25/2021, 03/18/2021    Tdap vaccine, age 7 year and older (BOOSTRIX, ADACEL) 07/08/2015, 08/24/2020    Varicella vaccine, subcutaneous (VARIVAX) 07/08/2015         Review of Systems  All pertinent positive symptoms are included in the history of present illness.  All other systems have been reviewed and are negative and noncontributory to this patient's current ailments.     Objective   /80 (BP Location: Left arm, Patient Position: Sitting, BP Cuff Size: Adult)   Pulse 82   Wt 58.5 kg (129 lb)   SpO2 98%   BMI 22.85 kg/m²   BSA: 1.61 meters squared  No visits with results within 1 Month(s) from this visit.   Latest known visit with results is:   Lab on 03/29/2024   Component Date Value Ref Range Status    Thyroid Stimulating Hormone 03/29/2024 1.63  0.44 - 3.98 mIU/L Final    Cholesterol 03/29/2024 155  0 - 199 mg/dL Final          Age      Desirable   Borderline High   High     0-19 Y     0 - 169       170 - 199     >/= 200    20-24 Y     0 - 189       190 - 224     >/= 225         >24 Y     0 - 199       200 - 239     >/= 240   **All ranges are based on fasting samples. Specific   therapeutic targets will vary based on patient-specific   cardiac risk.    Pediatric guidelines reference:Pediatrics 2011, 128(S5).Adult guidelines reference: NCEP ATPIII Guidelines,LORENA 2001, 258:2486-97    Venipuncture immediately after or during the administration of Metamizole may lead to falsely low results. Testing should be performed immediately prior to Metamizole dosing.    HDL-Cholesterol 03/29/2024 67.2  mg/dL Final      Age       Very Low   Low     Normal    High    0-19 Y    < 35      < 40     40-45      ----  20-24 Y    ----     < 40      >45      ----        >24 Y      ----     < 40     40-60      >60      Cholesterol/HDL Ratio 03/29/2024 2.3   Final      Ref Values  Desirable  < 3.4  High Risk  > 5.0    LDL Calculated 03/29/2024 76  <=99 mg/dL Final                                Near   Borderline      AGE      Desirable  Optimal    High     High     Very High     0-19 Y     0 - 109     ---    110-129   >/= 130     ----    20-24 Y     0 - 119     ---    120-159   >/= 160     ----      >24 Y     0 -  99   100-129  130-159   160-189     >/=190      VLDL 03/29/2024 12  0 - 40 mg/dL Final    Triglycerides 03/29/2024 58  0 - 149 mg/dL Final       Age         Desirable   Borderline High   High     Very High   0 D-90 D    19 - 174         ----         ----        ----  91 D- 9 Y     0 -  74        75 -  99     >/= 100      ----    10-19 Y     0 -  89        90 - 129     >/= 130      ----    20-24 Y     0 - 114       115 - 149     >/= 150      ----         >24 Y     0 - 149       150 - 199    200- 499    >/= 500    Venipuncture immediately after or during the administration of Metamizole may lead to falsely low results. Testing should be performed immediately prior to Metamizole dosing.    Non HDL Cholesterol 03/29/2024 88  0 - 149 mg/dL Final          Age       Desirable   Borderline High   High     Very High     0-19 Y     0 - 119       120 - 144     >/= 145    >/= 160    20-24 Y     0 - 149       150 - 189     >/= 190      ----         >24 Y    30 mg/dL above LDL Cholesterol goal      Glucose 03/29/2024 78  74 - 99 mg/dL Final    Sodium 03/29/2024 137  136 - 145 mmol/L Final    Potassium 03/29/2024 3.7  3.5 - 5.3 mmol/L Final    Chloride 03/29/2024 103  98 - 107 mmol/L Final    Bicarbonate 03/29/2024 28  21 - 32 mmol/L Final    Anion Gap 03/29/2024 10  10 - 20 mmol/L Final    Urea Nitrogen 03/29/2024 12  6 - 23 mg/dL Final    Creatinine 03/29/2024 0.85  0.50 - 1.05 mg/dL Final    eGFR 03/29/2024 >90  >60  mL/min/1.73m*2 Final    Calculations of estimated GFR are performed using the 2021 CKD-EPI Study Refit equation without the race variable for the IDMS-Traceable creatinine methods.  https://jasn.asnjournals.org/content/early/2021/09/22/ASN.5452199190    Calcium 03/29/2024 8.9  8.6 - 10.3 mg/dL Final    Albumin 03/29/2024 4.1  3.4 - 5.0 g/dL Final    Alkaline Phosphatase 03/29/2024 40  33 - 110 U/L Final    Total Protein 03/29/2024 6.9  6.4 - 8.2 g/dL Final    AST 03/29/2024 16  9 - 39 U/L Final    Bilirubin, Total 03/29/2024 0.5  0.0 - 1.2 mg/dL Final    ALT 03/29/2024 12  7 - 45 U/L Final    Patients treated with Sulfasalazine may generate falsely decreased results for ALT.    WBC 03/29/2024 7.2  4.4 - 11.3 x10*3/uL Final    nRBC 03/29/2024 0.0  0.0 - 0.0 /100 WBCs Final    RBC 03/29/2024 4.92  4.00 - 5.20 x10*6/uL Final    Hemoglobin 03/29/2024 14.6  12.0 - 16.0 g/dL Final    Hematocrit 03/29/2024 44.5  36.0 - 46.0 % Final    MCV 03/29/2024 90  80 - 100 fL Final    MCH 03/29/2024 29.7  26.0 - 34.0 pg Final    MCHC 03/29/2024 32.8  32.0 - 36.0 g/dL Final    RDW 03/29/2024 12.2  11.5 - 14.5 % Final    Platelets 03/29/2024 323  150 - 450 x10*3/uL Final    Neutrophils % 03/29/2024 59.0  40.0 - 80.0 % Final    Immature Granulocytes %, Automated 03/29/2024 0.4  0.0 - 0.9 % Final    Immature Granulocyte Count (IG) includes promyelocytes, myelocytes and metamyelocytes but does not include bands. Percent differential counts (%) should be interpreted in the context of the absolute cell counts (cells/UL).    Lymphocytes % 03/29/2024 31.5  13.0 - 44.0 % Final    Monocytes % 03/29/2024 7.0  2.0 - 10.0 % Final    Eosinophils % 03/29/2024 1.1  0.0 - 6.0 % Final    Basophils % 03/29/2024 1.0  0.0 - 2.0 % Final    Neutrophils Absolute 03/29/2024 4.24  1.20 - 7.70 x10*3/uL Final    Percent differential counts (%) should be interpreted in the context of the absolute cell counts (cells/uL).    Immature Granulocytes Absolute, Au*  03/29/2024 0.03  0.00 - 0.70 x10*3/uL Final    Lymphocytes Absolute 03/29/2024 2.26  1.20 - 4.80 x10*3/uL Final    Monocytes Absolute 03/29/2024 0.50  0.10 - 1.00 x10*3/uL Final    Eosinophils Absolute 03/29/2024 0.08  0.00 - 0.70 x10*3/uL Final    Basophils Absolute 03/29/2024 0.07  0.00 - 0.10 x10*3/uL Final    Hemoglobin A1C 03/29/2024 6.0 (H)  see below % Final    Estimated Average Glucose 03/29/2024 126  Not Established mg/dL Final       Physical Exam  CONSTITUTIONAL - well nourished, well developed, looks like stated age, in no acute distress, not ill-appearing, and not tired appearing  SKIN - normal skin color and pigmentation, normal skin turgor without rash, lesions, or nodules visualized  HEAD - no trauma, normocephalic  EYES - normal external exam  CHEST -no distressed breathing, good effort  EXTREMITIES - no edema, no deformities  NEUROLOGICAL - normal balance, normal motor, no ataxia  PSYCHIATRIC - alert, pleasant and cordial, age-appropriate    Assessment/Plan     1.  ADHD  Controlled on 10 mg Adderall, requesting refills  I have sent this prescription over to your preferred pharmacy  UDS/CSA completed on 2/2024    2.  Prediabetes  We ordered a hemoglobin A1c to be done after today's appointment  Will notify you with results and make recommendation accordingly    Please call the office with any questions/concerns.   Please follow up in 3 months for medication refill.  Next point will be September 2024

## 2024-06-11 LAB
EST. AVERAGE GLUCOSE BLD GHB EST-MCNC: 105 MG/DL
HBA1C MFR BLD: 5.3 %

## 2024-06-12 NOTE — RESULT ENCOUNTER NOTE
Hemoglobin A1c now well within normal limits at 5.3%    My suspicion was the elevation was previously either falsely elevated or even due to decreased exercise or even eating differently during the holiday season    Will continue monitoring yearly

## 2024-07-01 DIAGNOSIS — F90.9 ATTENTION DEFICIT HYPERACTIVITY DISORDER (ADHD), UNSPECIFIED ADHD TYPE: Primary | ICD-10-CM

## 2024-07-01 RX ORDER — DEXTROAMPHETAMINE SACCHARATE, AMPHETAMINE ASPARTATE MONOHYDRATE, DEXTROAMPHETAMINE SULFATE AND AMPHETAMINE SULFATE 3.75; 3.75; 3.75; 3.75 MG/1; MG/1; MG/1; MG/1
15 CAPSULE, EXTENDED RELEASE ORAL EVERY MORNING
COMMUNITY
End: 2024-07-01 | Stop reason: SDUPTHER

## 2024-07-01 RX ORDER — DEXTROAMPHETAMINE SACCHARATE, AMPHETAMINE ASPARTATE MONOHYDRATE, DEXTROAMPHETAMINE SULFATE AND AMPHETAMINE SULFATE 3.75; 3.75; 3.75; 3.75 MG/1; MG/1; MG/1; MG/1
15 CAPSULE, EXTENDED RELEASE ORAL EVERY MORNING
Qty: 30 CAPSULE | Refills: 0 | Status: SHIPPED | OUTPATIENT
Start: 2024-07-01

## 2024-07-22 ENCOUNTER — PATIENT MESSAGE (OUTPATIENT)
Dept: PRIMARY CARE | Facility: CLINIC | Age: 29
End: 2024-07-22
Payer: COMMERCIAL

## 2024-07-23 DIAGNOSIS — Z30.41 ENCOUNTER FOR SURVEILLANCE OF CONTRACEPTIVE PILLS: ICD-10-CM

## 2024-07-23 RX ORDER — NORETHINDRONE ACETATE AND ETHINYL ESTRADIOL 1MG-20(24)
1 KIT ORAL DAILY
Qty: 84 TABLET | Refills: 0 | Status: SHIPPED | OUTPATIENT
Start: 2024-07-23

## 2024-08-08 ENCOUNTER — APPOINTMENT (OUTPATIENT)
Dept: PRIMARY CARE | Facility: CLINIC | Age: 29
End: 2024-08-08
Payer: COMMERCIAL

## 2024-08-08 VITALS
SYSTOLIC BLOOD PRESSURE: 118 MMHG | BODY MASS INDEX: 22.85 KG/M2 | WEIGHT: 129 LBS | OXYGEN SATURATION: 100 % | DIASTOLIC BLOOD PRESSURE: 68 MMHG | HEART RATE: 74 BPM

## 2024-08-08 DIAGNOSIS — F90.9 ATTENTION DEFICIT HYPERACTIVITY DISORDER (ADHD), UNSPECIFIED ADHD TYPE: ICD-10-CM

## 2024-08-08 DIAGNOSIS — S03.40XA SPRAIN OF TEMPOROMANDIBULAR JOINT, INITIAL ENCOUNTER: Primary | ICD-10-CM

## 2024-08-08 PROCEDURE — 99214 OFFICE O/P EST MOD 30 MIN: CPT | Performed by: STUDENT IN AN ORGANIZED HEALTH CARE EDUCATION/TRAINING PROGRAM

## 2024-08-08 PROCEDURE — 1036F TOBACCO NON-USER: CPT | Performed by: STUDENT IN AN ORGANIZED HEALTH CARE EDUCATION/TRAINING PROGRAM

## 2024-08-08 RX ORDER — DEXTROAMPHETAMINE SACCHARATE, AMPHETAMINE ASPARTATE MONOHYDRATE, DEXTROAMPHETAMINE SULFATE AND AMPHETAMINE SULFATE 3.75; 3.75; 3.75; 3.75 MG/1; MG/1; MG/1; MG/1
15 CAPSULE, EXTENDED RELEASE ORAL EVERY MORNING
Qty: 30 CAPSULE | Refills: 0 | Status: SHIPPED | OUTPATIENT
Start: 2024-10-07

## 2024-08-08 RX ORDER — DEXTROAMPHETAMINE SACCHARATE, AMPHETAMINE ASPARTATE MONOHYDRATE, DEXTROAMPHETAMINE SULFATE AND AMPHETAMINE SULFATE 3.75; 3.75; 3.75; 3.75 MG/1; MG/1; MG/1; MG/1
15 CAPSULE, EXTENDED RELEASE ORAL EVERY MORNING
Qty: 30 CAPSULE | Refills: 0 | Status: SHIPPED | OUTPATIENT
Start: 2024-08-08

## 2024-08-08 RX ORDER — DEXTROAMPHETAMINE SACCHARATE, AMPHETAMINE ASPARTATE MONOHYDRATE, DEXTROAMPHETAMINE SULFATE AND AMPHETAMINE SULFATE 3.75; 3.75; 3.75; 3.75 MG/1; MG/1; MG/1; MG/1
15 CAPSULE, EXTENDED RELEASE ORAL EVERY MORNING
Qty: 30 CAPSULE | Refills: 0 | Status: SHIPPED | OUTPATIENT
Start: 2024-09-07

## 2024-08-08 ASSESSMENT — PATIENT HEALTH QUESTIONNAIRE - PHQ9
1. LITTLE INTEREST OR PLEASURE IN DOING THINGS: NOT AT ALL
2. FEELING DOWN, DEPRESSED OR HOPELESS: NOT AT ALL
SUM OF ALL RESPONSES TO PHQ9 QUESTIONS 1 AND 2: 0

## 2024-08-08 NOTE — PROGRESS NOTES
Subjective   Patient ID: Nell Almodovar is a 29 y.o. female who presents for ADHD and Temporomandibular Joint Pain.  Today she is accompanied by alone.     HPI  1.  ADHD  Patient was seen by Dr. Stewart on 01/20/24 and found to have ADHD, predominantly inattentive type, with recommendation for trial with stimulant medication  We started her on Adderall 10 mg daily as indicated the chart  She was increased to 15 mg  She has now been on this medication for almost 5/6 months and feels well  Ultimately feels better with this dosage and requesting refills  UDS/CSA up-to-date from earlier this year     Wishes to continue the current dosage without any changes     Denies any other signs/symptoms including chest pain, palpitations, or increased anxiety     2. TMJ   Patient has been having TMJ symptoms   She was referred to a specialist by her dentist to get scans and a    She was told the cost of the entire visit along with the guard would be $6,000    Would like to discuss this further looking for a possible ENT referral        Current Outpatient Medications on File Prior to Visit   Medication Sig Dispense Refill    Blisovi 24 Fe 1 mg-20 mcg (24)/75 mg (4) tablet Take 1 tablet by mouth once daily. 84 tablet 0    clindamycin (Cleocin T) 1 % lotion Apply 1 Application topically 2 times a day. For ingrown hairs 60 mL 11    [DISCONTINUED] amphetamine-dextroamphetamine XR (Adderall XR) 10 mg 24 hr capsule Take 1 capsule (10 mg) by mouth once daily in the morning. Do not crush or chew. 30 capsule 0    [DISCONTINUED] amphetamine-dextroamphetamine XR (Adderall XR) 10 mg 24 hr capsule Take 1 capsule (10 mg) by mouth once daily in the morning. Do not crush or chew. Do not fill before July 10, 2024. 30 capsule 0    [DISCONTINUED] amphetamine-dextroamphetamine XR (Adderall XR) 10 mg 24 hr capsule Take 1 capsule (10 mg) by mouth once daily in the morning. Do not crush or chew. Do not fill before August 9, 2024. 30 capsule 0     [DISCONTINUED] amphetamine-dextroamphetamine XR (Adderall XR) 15 mg 24 hr capsule Take 1 capsule (15 mg) by mouth once daily in the morning. Do not crush or chew. 30 capsule 0     No current facility-administered medications on file prior to visit.        Allergies   Allergen Reactions    Amoxicillin Hives     When I was little I think I had a rash       Immunization History   Administered Date(s) Administered    DTaP, Unspecified 10/30/1998    HPV 9-valent vaccine (GARDASIL 9) 08/18/2015, 12/30/2015    HPV, Quadrivalent 07/08/2015    Hepatitis B vaccine, 19 yrs and under (RECOMBIVAX, ENGERIX) 07/08/2015, 08/18/2015    Meningococcal ACWY-D (Menactra) 4-valent conjugate vaccine 07/08/2015    PPD Test 04/29/2015    Pfizer Purple Cap SARS-CoV-2 02/25/2021, 03/18/2021    Tdap vaccine, age 7 year and older (BOOSTRIX, ADACEL) 07/08/2015, 08/24/2020    Varicella vaccine, subcutaneous (VARIVAX) 07/08/2015         Review of Systems  All pertinent positive symptoms are included in the history of present illness.  All other systems have been reviewed and are negative and noncontributory to this patient's current ailments.     Objective   /68 (BP Location: Left arm, Patient Position: Sitting, BP Cuff Size: Adult)   Pulse 74   Wt 58.5 kg (129 lb)   SpO2 100%   BMI 22.85 kg/m²   BSA: 1.61 meters squared  No visits with results within 1 Month(s) from this visit.   Latest known visit with results is:   Lab on 06/10/2024   Component Date Value Ref Range Status    Hemoglobin A1C 06/10/2024 5.3  see below % Final    Estimated Average Glucose 06/10/2024 105  Not Established mg/dL Final       Physical Exam  CONSTITUTIONAL - well nourished, well developed, looks like stated age, in no acute distress, not ill-appearing, and not tired appearing  SKIN - normal skin color and pigmentation, normal skin turgor without rash, lesions, or nodules visualized  HEAD - no trauma, normocephalic  EYES - normal external exam  ENT - uvula  midline, normal tongue movement and throat normal, no exudate, nasal passage without discharge and patent; popping on right TMJ with jaw opening   CHEST - clear to auscultation, no wheezing, no crackles and no rales, good effort  CARDIAC - regular rate and regular rhythm, no skipped beats, no murmur  EXTREMITIES - no edema, no deformities  NEUROLOGICAL - normal balance, normal motor, no ataxia  PSYCHIATRIC - alert, pleasant and cordial, age-appropriate            Assessment/Plan   1.  ADHD  Controlled on 15 mg Adderall, requesting refills  I have sent this prescription over to your preferred pharmacy  UDS/CSA completed on 2/2024     2. TMJ  We made a referral for you to see Dr. Patrick, ENT, for you to schedule at your earliest mutual convenience  Please continue to monitor signs/symptoms      Please call the office with any questions/concerns.   Please follow up in 3 months for medication refill.  Next formant due November 2024

## 2024-08-26 ENCOUNTER — APPOINTMENT (OUTPATIENT)
Dept: DERMATOLOGY | Facility: CLINIC | Age: 29
End: 2024-08-26
Payer: COMMERCIAL

## 2024-08-29 ENCOUNTER — APPOINTMENT (OUTPATIENT)
Dept: OTOLARYNGOLOGY | Facility: CLINIC | Age: 29
End: 2024-08-29
Payer: COMMERCIAL

## 2024-08-29 VITALS — TEMPERATURE: 97.6 F | BODY MASS INDEX: 22.86 KG/M2 | WEIGHT: 129 LBS | HEIGHT: 63 IN

## 2024-08-29 DIAGNOSIS — H92.03 REFERRED OTALGIA OF BOTH EARS: ICD-10-CM

## 2024-08-29 DIAGNOSIS — J34.3 HYPERTROPHY OF NASAL TURBINATES: ICD-10-CM

## 2024-08-29 DIAGNOSIS — J30.89 NON-SEASONAL ALLERGIC RHINITIS DUE TO OTHER ALLERGIC TRIGGER: ICD-10-CM

## 2024-08-29 DIAGNOSIS — M26.629 TMJ SYNDROME: ICD-10-CM

## 2024-08-29 DIAGNOSIS — R09.81 CHRONIC NASAL CONGESTION: ICD-10-CM

## 2024-08-29 DIAGNOSIS — J34.2 DEVIATED NASAL SEPTUM: Primary | ICD-10-CM

## 2024-08-29 PROCEDURE — 1036F TOBACCO NON-USER: CPT | Performed by: OTOLARYNGOLOGY

## 2024-08-29 PROCEDURE — 3008F BODY MASS INDEX DOCD: CPT | Performed by: OTOLARYNGOLOGY

## 2024-08-29 PROCEDURE — 99204 OFFICE O/P NEW MOD 45 MIN: CPT | Performed by: OTOLARYNGOLOGY

## 2024-08-29 RX ORDER — AZELASTINE 1 MG/ML
2 SPRAY, METERED NASAL 2 TIMES DAILY
Qty: 90 ML | Refills: 0 | Status: SHIPPED | OUTPATIENT
Start: 2024-08-29

## 2024-08-29 RX ORDER — FLUTICASONE PROPIONATE 50 MCG
2 SPRAY, SUSPENSION (ML) NASAL DAILY
Qty: 48 ML | Refills: 0 | Status: SHIPPED | OUTPATIENT
Start: 2024-08-29

## 2024-08-29 ASSESSMENT — ENCOUNTER SYMPTOMS: TROUBLE SWALLOWING: 1

## 2024-08-29 NOTE — PROGRESS NOTES
Subjective   Patient ID: Nell Almodovar is a 29 y.o. female  HPI  Patient is complaining of a chronic history of nasal congestion.  The nasal congestion seems to be worse on the left side.  She also complains of intermittent bilateral otalgia.  She has been diagnosed with TMJ syndrome previously.  She has no otorrhea and no hearing loss and no vertigo.  She does complain of occasional crackling noise in the ears with movement of the temporomandibular joints.  She also complains of chronic clear-colored rhinorrhea and postnasal drainage.    Review of Systems   HENT:  Positive for congestion, tinnitus and trouble swallowing.         Snoring   Respiratory:          Congestion       Objective   Physical Exam  The following elements of a brief ear nose and throat exam were performed: External ear canals and tympanic membranes, external nose and nasal passages, oral cavity, palpation of the neck, percussion of the face, palpation of the thyroid.    There is a severe nasal septal deviation to the left side and inferior turbinates are enlarged.  There was no polyps or purulence noted.  There is tenderness with palpation of the tympanum and debility joints bilaterally and there is evidence of bruxism noted on the lower teeth.  The remainder of her exam was within normal limits.    Assessment/Plan   Diagnoses and all orders for this visit:  Deviated nasal septum (Primary)  Non-seasonal allergic rhinitis due to other allergic trigger  -     azelastine (Astelin) 137 mcg (0.1 %) nasal spray; Administer 2 sprays into each nostril 2 times a day.  -     fluticasone (Flonase) 50 mcg/actuation nasal spray; Administer 2 sprays into each nostril once daily. Shake gently. Before first use, prime pump. After use, clean tip and replace cap.  Hypertrophy of nasal turbinates  Referred otalgia of both ears  TMJ syndrome  Chronic nasal congestion     1.  Chronic allergic rhinitis.  The patient was started on Flonase and Astelin nasal  sprays.  2.  Chronic deviation of the septum and hypertrophy of the inferior turbinates leading to chronic nasal congestion.  Septoplasty and bilateral turbinoplasty will be considered if the nasal congestion does not improve.  3.  Chronic bilateral referred otalgia and TMJ syndrome.  The patient was started on TMJ precautions.  She will follow-up in 1 month.

## 2024-09-26 ENCOUNTER — APPOINTMENT (OUTPATIENT)
Dept: OTOLARYNGOLOGY | Facility: CLINIC | Age: 29
End: 2024-09-26
Payer: COMMERCIAL

## 2024-09-26 VITALS — BODY MASS INDEX: 22.86 KG/M2 | WEIGHT: 129 LBS | TEMPERATURE: 97.6 F | HEIGHT: 63 IN

## 2024-09-26 DIAGNOSIS — H92.03 REFERRED OTALGIA OF BOTH EARS: ICD-10-CM

## 2024-09-26 DIAGNOSIS — J30.89 NON-SEASONAL ALLERGIC RHINITIS DUE TO OTHER ALLERGIC TRIGGER: Primary | ICD-10-CM

## 2024-09-26 DIAGNOSIS — J34.2 DEVIATED NASAL SEPTUM: ICD-10-CM

## 2024-09-26 DIAGNOSIS — R09.81 CHRONIC NASAL CONGESTION: ICD-10-CM

## 2024-09-26 DIAGNOSIS — M26.629 TMJ SYNDROME: ICD-10-CM

## 2024-09-26 DIAGNOSIS — J34.3 HYPERTROPHY OF NASAL TURBINATES: ICD-10-CM

## 2024-09-26 PROCEDURE — 31231 NASAL ENDOSCOPY DX: CPT | Performed by: OTOLARYNGOLOGY

## 2024-09-26 PROCEDURE — 3008F BODY MASS INDEX DOCD: CPT | Performed by: OTOLARYNGOLOGY

## 2024-09-26 PROCEDURE — 1036F TOBACCO NON-USER: CPT | Performed by: OTOLARYNGOLOGY

## 2024-09-26 PROCEDURE — 99214 OFFICE O/P EST MOD 30 MIN: CPT | Performed by: OTOLARYNGOLOGY

## 2024-09-26 RX ORDER — SODIUM CHLORIDE, SODIUM LACTATE, POTASSIUM CHLORIDE, CALCIUM CHLORIDE 600; 310; 30; 20 MG/100ML; MG/100ML; MG/100ML; MG/100ML
20 INJECTION, SOLUTION INTRAVENOUS CONTINUOUS
OUTPATIENT
Start: 2024-09-26

## 2024-09-26 NOTE — PROGRESS NOTES
Subjective   Patient ID: Nell Almodovar is a 29 y.o. female  HPI  Patient presents for follow-up for chronic allergic rhinitis and chronic bilateral referred otalgia and TMJ syndrome and chronic nasal congestion.  She is feeling better and the ear discomfort has resolved.  The allergy symptoms have also cleared but she continues to complain of difficulty breathing through her nose especially on the left side.  She has no purulence from her nose and no facial pain.    Review of Systems    Objective   Physical Exam  There is nasal edema and the turbinates are pale.  There is severe deviation of the septum to the left side and inferior turbinates are large.  The posterior nasal passage could not be visualized.  The TMJ tenderness has resolved bilaterally.  Nasal endoscopy was offered in light of the persistent nasal congestion.  There is a severe deviation of the septum to the left side along the entire length of the septum and inferior turbinates are enlarged.  There was a bone spur on the right side.  There was no polyps or purulence and the nasopharynx was clear.    Nasal / sinus endoscopy    Date/Time: 9/26/2024 4:07 PM    Performed by: Moe Ptarick MD  Authorized by: Moe Patrick MD    Consent:     Consent obtained:  Verbal  Procedure details:     Meds administered: lidocaine and afrin.    Instrument: flexible fiberoptic nasal endoscope    Comments:      After informed consent was discussed and obtained, and after topical anesthesia was applied, the flexible endoscope was inserted atraumatically in to each nasal cavity.  Three passes were made with the scope superiorly along the middle turbinate and middle meatus and along the floor of the nose.  Please see office note for other findings.  The patient tolerated all portions of the procedure well.      Assessment/Plan   Diagnoses and all orders for this visit:  Non-seasonal allergic rhinitis due to other allergic trigger (Primary)  Deviated nasal septum  -      Case Request Operating Room: Repair Septum Nasal Cavity, Excision Nasal Turbinate; Standing  -     Case Request Operating Room: Repair Septum Nasal Cavity, Excision Nasal Turbinate  Hypertrophy of nasal turbinates  -     Case Request Operating Room: Repair Septum Nasal Cavity, Excision Nasal Turbinate; Standing  -     Case Request Operating Room: Repair Septum Nasal Cavity, Excision Nasal Turbinate  Referred otalgia of both ears  TMJ syndrome  Chronic nasal congestion  -     Nasal / sinus endoscopy  Other orders  -     NPO Diet Except: Sips with meds; Effective now; Standing  -     Height and weight; Standing  -     Insert and maintain peripheral IV; Standing  -     Saline lock IV; Standing  -     POCT pregnancy, urine; Standing  -     Type And Screen; Standing  -     lactated Ringer's infusion  -     Place in outpatient/hospital ambulatory surgery; Standing  -     Full code; Standing  -     Vital Signs; Standing  -     Pulse oximetry, spot; Standing     1.  Chronic allergic rhinitis improved with Flonase and Astelin nasal sprays.  2.  Chronic bilateral referred otalgia and TMJ syndrome resolved with TMJ precautions.  3.  Chronic deviation of the septum and hypertrophy of inferior turbinates with persistent nasal congestion despite management of the allergies.  The patient was offered septoplasty and bilateral submucous resection of inferior turbinates.  The risk and benefits and alternatives were explained including the option of no surgery and if she decides to proceed then she will be scheduled as soon as possible.

## 2024-10-21 ENCOUNTER — PREP FOR PROCEDURE (OUTPATIENT)
Dept: OTOLARYNGOLOGY | Facility: CLINIC | Age: 29
End: 2024-10-21
Payer: COMMERCIAL

## 2024-10-21 DIAGNOSIS — J34.3 HYPERTROPHY OF BOTH INFERIOR NASAL TURBINATES: ICD-10-CM

## 2024-10-21 DIAGNOSIS — J34.2 DEVIATED NASAL SEPTUM: Primary | ICD-10-CM

## 2024-11-22 ENCOUNTER — APPOINTMENT (OUTPATIENT)
Dept: PRIMARY CARE | Facility: CLINIC | Age: 29
End: 2024-11-22
Payer: COMMERCIAL

## 2024-11-25 DIAGNOSIS — J30.89 NON-SEASONAL ALLERGIC RHINITIS DUE TO OTHER ALLERGIC TRIGGER: ICD-10-CM

## 2024-11-25 RX ORDER — FLUTICASONE PROPIONATE 50 MCG
2 SPRAY, SUSPENSION (ML) NASAL DAILY
Qty: 48 ML | Refills: 0 | Status: SHIPPED | OUTPATIENT
Start: 2024-11-25

## 2024-11-27 ENCOUNTER — PRE-ADMISSION TESTING (OUTPATIENT)
Dept: PREADMISSION TESTING | Facility: HOSPITAL | Age: 29
End: 2024-11-27
Payer: COMMERCIAL

## 2024-11-27 ENCOUNTER — ANESTHESIA EVENT (OUTPATIENT)
Dept: OPERATING ROOM | Facility: HOSPITAL | Age: 29
End: 2024-11-27
Payer: COMMERCIAL

## 2024-11-27 VITALS
HEART RATE: 61 BPM | TEMPERATURE: 96.8 F | HEIGHT: 65 IN | OXYGEN SATURATION: 100 % | SYSTOLIC BLOOD PRESSURE: 125 MMHG | DIASTOLIC BLOOD PRESSURE: 81 MMHG | WEIGHT: 123.46 LBS | RESPIRATION RATE: 18 BRPM | BODY MASS INDEX: 20.57 KG/M2

## 2024-11-27 DIAGNOSIS — J34.2 DEVIATED NASAL SEPTUM: ICD-10-CM

## 2024-11-27 DIAGNOSIS — J34.3 HYPERTROPHY OF BOTH INFERIOR NASAL TURBINATES: ICD-10-CM

## 2024-11-27 DIAGNOSIS — Z01.818 PREOPERATIVE EXAMINATION: Primary | ICD-10-CM

## 2024-11-27 LAB
ANION GAP SERPL CALC-SCNC: 13 MMOL/L (ref 10–20)
APTT PPP: 30 SECONDS (ref 27–38)
BUN SERPL-MCNC: 13 MG/DL (ref 6–23)
CALCIUM SERPL-MCNC: 9.3 MG/DL (ref 8.6–10.3)
CHLORIDE SERPL-SCNC: 102 MMOL/L (ref 98–107)
CO2 SERPL-SCNC: 26 MMOL/L (ref 21–32)
CREAT SERPL-MCNC: 0.87 MG/DL (ref 0.5–1.05)
EGFRCR SERPLBLD CKD-EPI 2021: >90 ML/MIN/1.73M*2
ERYTHROCYTE [DISTWIDTH] IN BLOOD BY AUTOMATED COUNT: 11.9 % (ref 11.5–14.5)
GLUCOSE SERPL-MCNC: 81 MG/DL (ref 74–99)
HCT VFR BLD AUTO: 44.7 % (ref 36–46)
HGB BLD-MCNC: 14.9 G/DL (ref 12–16)
INR PPP: 1 (ref 0.9–1.1)
MCH RBC QN AUTO: 29.2 PG (ref 26–34)
MCHC RBC AUTO-ENTMCNC: 33.3 G/DL (ref 32–36)
MCV RBC AUTO: 88 FL (ref 80–100)
NRBC BLD-RTO: 0 /100 WBCS (ref 0–0)
PLATELET # BLD AUTO: 355 X10*3/UL (ref 150–450)
POTASSIUM SERPL-SCNC: 4.3 MMOL/L (ref 3.5–5.3)
PROTHROMBIN TIME: 11.3 SECONDS (ref 9.8–12.8)
RBC # BLD AUTO: 5.1 X10*6/UL (ref 4–5.2)
SODIUM SERPL-SCNC: 137 MMOL/L (ref 136–145)
WBC # BLD AUTO: 7.9 X10*3/UL (ref 4.4–11.3)

## 2024-11-27 PROCEDURE — 80048 BASIC METABOLIC PNL TOTAL CA: CPT

## 2024-11-27 PROCEDURE — 36415 COLL VENOUS BLD VENIPUNCTURE: CPT

## 2024-11-27 PROCEDURE — 85027 COMPLETE CBC AUTOMATED: CPT

## 2024-11-27 PROCEDURE — 85730 THROMBOPLASTIN TIME PARTIAL: CPT

## 2024-11-27 PROCEDURE — 99203 OFFICE O/P NEW LOW 30 MIN: CPT | Performed by: NURSE PRACTITIONER

## 2024-11-27 PROCEDURE — 85610 PROTHROMBIN TIME: CPT

## 2024-11-27 ASSESSMENT — ENCOUNTER SYMPTOMS
MUSCULOSKELETAL NEGATIVE: 1
CONSTITUTIONAL NEGATIVE: 1
RESPIRATORY NEGATIVE: 1
EYES NEGATIVE: 1
CARDIOVASCULAR NEGATIVE: 1
NEUROLOGICAL NEGATIVE: 1
GASTROINTESTINAL NEGATIVE: 1
NECK NEGATIVE: 1

## 2024-11-27 ASSESSMENT — DUKE ACTIVITY SCORE INDEX (DASI)
CAN YOU PARTICIPATE IN STRENOUS SPORTS LIKE SWIMMING, SINGLES TENNIS, FOOTBALL, BASKETBALL, OR SKIING: NO
CAN YOU DO LIGHT WORK AROUND THE HOUSE LIKE DUSTING OR WASHING DISHES: YES
CAN YOU WALK INDOORS, SUCH AS AROUND YOUR HOUSE: YES
CAN YOU RUN A SHORT DISTANCE: YES
CAN YOU TAKE CARE OF YOURSELF (EAT, DRESS, BATHE, OR USE TOILET): YES
TOTAL_SCORE: 50.7
CAN YOU WALK A BLOCK OR TWO ON LEVEL GROUND: YES
CAN YOU DO YARD WORK LIKE RAKING LEAVES, WEEDING OR PUSHING A MOWER: YES
CAN YOU HAVE SEXUAL RELATIONS: YES
CAN YOU CLIMB A FLIGHT OF STAIRS OR WALK UP A HILL: YES
CAN YOU DO HEAVY WORK AROUND THE HOUSE LIKE SCRUBBING FLOORS OR LIFTING AND MOVING HEAVY FURNITURE: YES
CAN YOU PARTICIPATE IN MODERATE RECREATIONAL ACTIVITIES LIKE GOLF, BOWLING, DANCING, DOUBLES TENNIS OR THROWING A BASEBALL OR FOOTBALL: YES
DASI METS SCORE: 9
CAN YOU DO MODERATE WORK AROUND THE HOUSE LIKE VACUUMING, SWEEPING FLOORS OR CARRYING GROCERIES: YES

## 2024-11-27 ASSESSMENT — ACTIVITIES OF DAILY LIVING (ADL): ADL_SCORE: 0

## 2024-11-27 ASSESSMENT — LIFESTYLE VARIABLES: SMOKING_STATUS: NONSMOKER

## 2024-11-27 NOTE — CPM/PAT H&P
CPM/PAT Evaluation       Name: Nell Almodovar (Nell Almodovar)  /Age: 1995/29 y.o.     Visit Type:   In-Person       Chief Complaint: Deviated nasal septum    HPI 28 y/o male scheduled for total cavity excision on 2024 with  Dr. Patrick secondary to deviated nasal septum.  PMHX includes ADHD.  PAT is consulted today for perioperative risk stratification and optimization.      Past Medical History:   Diagnosis Date    ADHD     Encounter for pregnancy test, result unknown 2021    Encounter for pregnancy test    TMJ (dislocation of temporomandibular joint)        Past Surgical History:   Procedure Laterality Date    OTHER SURGICAL HISTORY  2022    [place] tooth extraction       Patient  has no history on file for sexual activity.    Family History   Problem Relation Name Age of Onset    No Known Problems Mother      No Known Problems Father      Sleep apnea Other      Atrial fibrillation Other         Allergies   Allergen Reactions    Amoxicillin Hives     When I was little I think I had a rash       Prior to Admission medications    Medication Sig Start Date End Date Taking? Authorizing Provider   amphetamine-dextroamphetamine XR (Adderall XR) 15 mg 24 hr capsule Take 1 capsule (15 mg) by mouth once daily in the morning. Do not crush or chew. 24   Ante T Pletikosic, DO   amphetamine-dextroamphetamine XR (Adderall XR) 15 mg 24 hr capsule Take 1 capsule (15 mg) by mouth once daily in the morning. Do not crush or chew. Do not fill before 2024. 24   Ante T Pletikosic, DO   amphetamine-dextroamphetamine XR (Adderall XR) 15 mg 24 hr capsule Take 1 capsule (15 mg) by mouth once daily in the morning. Do not crush or chew. Do not fill before 2024. 10/7/24   Ante T Pletikosic, DO   azelastine (Astelin) 137 mcg (0.1 %) nasal spray Administer 2 sprays into each nostril 2 times a day. 24   Moe Patrick MD   Blisovi 24 Fe 1 mg-20 mcg (24)/75 mg (4) tablet Take 1 tablet  by mouth once daily. 7/23/24   Ante T Pletikosic, DO   clindamycin (Cleocin T) 1 % lotion Apply 1 Application topically 2 times a day. For ingrown hairs 4/1/24   Abel Thornton MD   fluticasone (Flonase) 50 mcg/actuation nasal spray ADMINISTER 2 SPRAYS INTO EACH NOSTRIL ONCE DAILY. SHAKE GENTLY. BEFORE FIRST USE, PRIME PUMP. AFTER USE, CLEAN TIP AND REPLACE CAP. 11/25/24   Moe Patrick MD   fluticasone (Flonase) 50 mcg/actuation nasal spray Administer 2 sprays into each nostril once daily. Shake gently. Before first use, prime pump. After use, clean tip and replace cap. 8/29/24 11/25/24  Moe Patrick MD        PAT ROS:   Constitutional:   neg    Neuro/Psych:   neg    Eyes:   neg    Ears:   neg    Nose:   Mouth:   Throat:   Neck:   neg    Cardio:   neg    Respiratory:   neg    Endocrine:   GI:   neg    :   neg    Musculoskeletal:   neg    Hematologic:   Skin:      Physical Exam  Vitals reviewed.   Constitutional:       Appearance: Normal appearance.   HENT:      Head: Normocephalic and atraumatic.      Mouth/Throat:      Mouth: Mucous membranes are moist.      Pharynx: Oropharynx is clear.   Eyes:      Extraocular Movements: Extraocular movements intact.      Pupils: Pupils are equal, round, and reactive to light.   Cardiovascular:      Rate and Rhythm: Normal rate and regular rhythm.   Pulmonary:      Effort: Pulmonary effort is normal.      Breath sounds: Normal breath sounds.   Abdominal:      General: Abdomen is flat.      Palpations: Abdomen is soft.   Musculoskeletal:         General: Normal range of motion.      Cervical back: Normal range of motion and neck supple.   Skin:     General: Skin is warm and dry.   Neurological:      General: No focal deficit present.      Mental Status: She is alert and oriented to person, place, and time.   Psychiatric:         Mood and Affect: Mood normal.         Behavior: Behavior normal.          Airway    Testing/Diagnostic:     Patient Specialist/PCP:     Visit  Vitals  /81   Pulse 61   Temp 36 °C (96.8 °F) (Temporal)   Resp 18       DASI Risk Score      Flowsheet Row Pre-Admission Testing from 11/27/2024 in [facility]   Can you take care of yourself (eat, dress, bathe, or use toilet)?  2.75 filed at 11/27/2024 0908   Can you walk indoors, such as around your house? 1.75 filed at 11/27/2024 0908   Can you walk a block or two on level ground?  2.75 filed at 11/27/2024 0908   Can you climb a flight of stairs or walk up a hill? 5.5 filed at 11/27/2024 0908   Can you run a short distance? 8 filed at 11/27/2024 0908   Can you do light work around the house like dusting or washing dishes? 2.7 filed at 11/27/2024 0908   Can you do moderate work around the house like vacuuming, sweeping floors or carrying groceries? 3.5 filed at 11/27/2024 0908   Can you do heavy work around the house like scrubbing floors or lifting and moving heavy furniture?  8 filed at 11/27/2024 0908   Can you do yard work like raking leaves, weeding or pushing a mower? 4.5 filed at 11/27/2024 0908   Can you have sexual relations? 5.25 filed at 11/27/2024 0908   Can you participate in moderate recreational activities like golf, bowling, dancing, doubles tennis or throwing a baseball or football? 6 filed at 11/27/2024 0908   Can you participate in strenous sports like swimming, singles tennis, football, basketball, or skiing? 0 filed at 11/27/2024 0908   DASI SCORE 50.7 filed at 11/27/2024 0908   METS Score (Will be calculated only when all the questions are answered) 9 filed at 11/27/2024 0908          Caprini DVT Assessment      Flowsheet Row Pre-Admission Testing from 11/27/2024 in [facility]   DVT Score 3 filed at 11/27/2024 0914   Surgical Factors Minor surgery planned filed at 11/27/2024 0914   Women Oral contraceptives filed at 11/27/2024 0914   BMI 30 or less filed at 11/27/2024 0914          Modified Frailty Index    No data to display       CHADS2 Stroke Risk  Current  as of about an hour ago        N/A 3 to 100%: High Risk   2 to < 3%: Medium Risk   0 to < 2%: Low Risk     Last Change: N/A          This score determines the patient's risk of having a stroke if the patient has atrial fibrillation.        This score is not applicable to this patient. Components are not calculated.          Revised Cardiac Risk Index      Flowsheet Row Pre-Admission Testing from 11/27/2024 in [facility]   High-Risk Surgery (Intraperitoneal, Intrathoracic,Suprainguinal vascular) 0 filed at 11/27/2024 0812   History of ischemic heart disease (History of MI, History of positive exercuse test, Current chest paint considered due to myocardial ischemia, Use of nitrate therapy, ECG with pathological Q Waves) 0 filed at 11/27/2024 0812   History of congestive heart failure (pulmonary edemia, bilateral rales or S3 gallop, Paroxysmal nocturnal dyspnea, CXR showing pulmonary vascular redistribution) 0 filed at 11/27/2024 0812   History of cerebrovascular disease (Prior TIA or stroke) 0 filed at 11/27/2024 0812   Pre-operative insulin treatment 0 filed at 11/27/2024 0812   Pre-operative creatinine>2 mg/dl 0 filed at 11/27/2024 0812   Revised Cardiac Risk Calculator 0 filed at 11/27/2024 0812          Apfel Simplified Score      Flowsheet Row Pre-Admission Testing from 11/27/2024 in [facility]   Smoking status 1 filed at 11/27/2024 0812   History of motion sickness or PONV  0 filed at 11/27/2024 0812   Use of postoperative opioids 1 filed at 11/27/2024 0812   Gender - Female 1=Yes filed at 11/27/2024 0812   Apfel Simplified Score Calculator 3 filed at 11/27/2024 0812          Risk Analysis Index Results This Encounter         11/27/2024  0908             Do you live in a place other than your own home?: 1    Any kidney failure, kidney not working well, or seeing a kidney doctor (nephrologist)? If yes, was this for kidney stones or another problem?: 0 No    Any history of chronic  (long-term) congestive heart failure (CHF)?: 0 No    Any shortness of breath when resting?: 0 No    In the past five years, have you been diagnosed with or treated for cancer?: No    During the last 3 months has it become difficult for you to remember things or organize your thoughts?: 0 No    Have you lost weight of 10 pounds or more in the past 3 months without trying?: 0 No    Do you have any loss of appetitie?: 0 No    Getting Around (Mobility): 0 Can get around without help    Eatin Can plan and prepare own meals    Toiletin Can use toilet without any help    Personal Hygiene (Bathing, Hand Washing, Changing Clothes): 0 Can shower or bathe without any help    URBAN Cancer History: Patient does not indicate history of cancer    Total Risk Analysis Index Score Without Cancer: 5    Total Risk Analysis Index Score: 5          Stop Bang Score      Flowsheet Row Pre-Admission Testing from 2024 in [facility]   Do you snore loudly? 0 filed at 2024 0908   Do you often feel tired or fatigued after your sleep? 0 filed at 2024 0908   Has anyone ever observed you stop breathing in your sleep? 0 filed at 2024 0908   Do you have or are you being treated for high blood pressure? 0 filed at 2024 0908   Recent BMI (Calculated) 22.9 filed at 2024 0908   Is BMI greater than 35 kg/m2? 0=No filed at 2024 0908   Age older than 50 years old? 0=No filed at 2024 0908   Is your neck circumference greater than 17 inches (Male) or 16 inches (Female)? 0 filed at 2024 0908   Gender - Male 0=No filed at 2024 0908   STOP-BANG Total Score 0 filed at 2024 0908          Prodigy: High Risk  Total Score: 0          ARISCAT Score for Postoperative Pulmonary Complications      Flowsheet Row Pre-Admission Testing from 2024 in [facility]   Age, years  0 filed at 2024 0812   Preoperative SpO2 0 filed at 2024 0812   Respiratory infection  in the last month Either upper or lower (i.e., URI, bronchitis, pneumonia), with fever and antibiotic treatment 0 filed at 11/27/2024 0812   Preoperative anemoa (Hgb less than 10 g/dl) 0 filed at 11/27/2024 0812   Surgical incision  0 filed at 11/27/2024 0812   Duration of surgery  0 filed at 11/27/2024 0812   Emergency Procedure  0 filed at 11/27/2024 0812   ARISCAT Total Score  0 filed at 11/27/2024 0812          Miguel Angel Perioperative Risk for Myocardial Infarction or Cardiac Arrest (JESSICA)      Flowsheet Row Pre-Admission Testing from 11/27/2024 in [facility]   Age 0.56 filed at 11/27/2024 0813   Functional Status  0 filed at 11/27/2024 0813   ASA Class  -5.17 filed at 11/27/2024 0813   Creatinine 0 filed at 11/27/2024 0813   Type of Procedure  0.71 filed at 11/27/2024 0813   JESSICA Total Score  -9.17 filed at 11/27/2024 0813   JESSICA % 0.01 filed at 11/27/2024 0813            Assessment and Plan:     HPI 30 y/o male scheduled for total cavity excision on 12/6/2024 with  Dr. Patrick secondary to deviated nasal septum.  PMHX includes ADHD.  PAT is consulted today for perioperative risk stratification and optimization.      Neuro:  No neurologic diagnosis or significant findings on chart review, clinical presentation and evaluation.  No grossly apparent neurologic perioperative risk.    Patient is not at increased risk for perioperative CVA    Psych:  ADHD - Holding Adderall the day of procedure    HEENT:  Follows with Dr. Patrick.  Last seen 8/29/2024 for deviated nasal septum  Plan for repair septum nasal cavity excision nasal turbinate    Cardiovascular:  No CV diagnosis or significant findings on chart review or clinical presentation and evaluation. No further preoperative testing or intervention is indicated at this time.  METS: 9  RCRI: 0 points, 3.9%  risk for postoperative MACE     Pulmonary:  No pulmonary diagnosis or significant findings on chart review or clinical presentation.  No further  preoperative testing is indicated at this time.  Stop Bang score is 0 placing patient at low risk for GENEVA  PRODIGY: Low risk for opioid induced respiratory depression      Renal:   No renal diagnosis or significant findings on chart review, clinical presentation or evaluation. No further preoperative testing/intervention is indicated at this time.    Endocrine:  No endocrine diagnosis or significant findings on chart review or clinical presentation and evaluation. No further testing or intervention is indicated at this time.    Hematologic:  No hematologic diagnosis, however patient is at an increased risk for DVT  Caprini Score 3, patient at High risk for perioperative DVT.  Patient provided with VTE education/handout.    Gastrointestinal:   No GI diagnosis or significant findings on chart review or clinical presentation and evaluation.   Apfel 3    Infectious disease:   No infectious diagnosis or significant findings on chart review or clinical presentation and evaluation.     Musculoskeletal:   No diagnosis or significant findings on chart review or clinical presentation and evaluation.     Anesthesia/Airway:  No anesthesia complications      Medication instructions and NPO guidelines reviewed with the patient.  All questions or concerns discussed and addressed.      Labs ordered

## 2024-11-27 NOTE — H&P (VIEW-ONLY)
CPM/PAT Evaluation       Name: Nell Almodovar (Nell Almodovar)  /Age: 1995/29 y.o.     Visit Type:   In-Person       Chief Complaint: Deviated nasal septum    HPI 30 y/o male scheduled for total cavity excision on 2024 with  Dr. Patrick secondary to deviated nasal septum.  PMHX includes ADHD.  PAT is consulted today for perioperative risk stratification and optimization.      Past Medical History:   Diagnosis Date    ADHD     Encounter for pregnancy test, result unknown 2021    Encounter for pregnancy test    TMJ (dislocation of temporomandibular joint)        Past Surgical History:   Procedure Laterality Date    OTHER SURGICAL HISTORY  2022    [place] tooth extraction       Patient  has no history on file for sexual activity.    Family History   Problem Relation Name Age of Onset    No Known Problems Mother      No Known Problems Father      Sleep apnea Other      Atrial fibrillation Other         Allergies   Allergen Reactions    Amoxicillin Hives     When I was little I think I had a rash       Prior to Admission medications    Medication Sig Start Date End Date Taking? Authorizing Provider   amphetamine-dextroamphetamine XR (Adderall XR) 15 mg 24 hr capsule Take 1 capsule (15 mg) by mouth once daily in the morning. Do not crush or chew. 24   Ante T Pletikosic, DO   amphetamine-dextroamphetamine XR (Adderall XR) 15 mg 24 hr capsule Take 1 capsule (15 mg) by mouth once daily in the morning. Do not crush or chew. Do not fill before 2024. 24   Ante T Pletikosic, DO   amphetamine-dextroamphetamine XR (Adderall XR) 15 mg 24 hr capsule Take 1 capsule (15 mg) by mouth once daily in the morning. Do not crush or chew. Do not fill before 2024. 10/7/24   Ante T Pletikosic, DO   azelastine (Astelin) 137 mcg (0.1 %) nasal spray Administer 2 sprays into each nostril 2 times a day. 24   Moe Patrick MD   Blisovi 24 Fe 1 mg-20 mcg (24)/75 mg (4) tablet Take 1 tablet  by mouth once daily. 7/23/24   Ante T Pletikosic, DO   clindamycin (Cleocin T) 1 % lotion Apply 1 Application topically 2 times a day. For ingrown hairs 4/1/24   Abel Thornton MD   fluticasone (Flonase) 50 mcg/actuation nasal spray ADMINISTER 2 SPRAYS INTO EACH NOSTRIL ONCE DAILY. SHAKE GENTLY. BEFORE FIRST USE, PRIME PUMP. AFTER USE, CLEAN TIP AND REPLACE CAP. 11/25/24   Moe Patrick MD   fluticasone (Flonase) 50 mcg/actuation nasal spray Administer 2 sprays into each nostril once daily. Shake gently. Before first use, prime pump. After use, clean tip and replace cap. 8/29/24 11/25/24  Moe Patrick MD        PAT ROS:   Constitutional:   neg    Neuro/Psych:   neg    Eyes:   neg    Ears:   neg    Nose:   Mouth:   Throat:   Neck:   neg    Cardio:   neg    Respiratory:   neg    Endocrine:   GI:   neg    :   neg    Musculoskeletal:   neg    Hematologic:   Skin:      Physical Exam  Vitals reviewed.   Constitutional:       Appearance: Normal appearance.   HENT:      Head: Normocephalic and atraumatic.      Mouth/Throat:      Mouth: Mucous membranes are moist.      Pharynx: Oropharynx is clear.   Eyes:      Extraocular Movements: Extraocular movements intact.      Pupils: Pupils are equal, round, and reactive to light.   Cardiovascular:      Rate and Rhythm: Normal rate and regular rhythm.   Pulmonary:      Effort: Pulmonary effort is normal.      Breath sounds: Normal breath sounds.   Abdominal:      General: Abdomen is flat.      Palpations: Abdomen is soft.   Musculoskeletal:         General: Normal range of motion.      Cervical back: Normal range of motion and neck supple.   Skin:     General: Skin is warm and dry.   Neurological:      General: No focal deficit present.      Mental Status: She is alert and oriented to person, place, and time.   Psychiatric:         Mood and Affect: Mood normal.         Behavior: Behavior normal.          Airway    Testing/Diagnostic:     Patient Specialist/PCP:     Visit  Vitals  /81   Pulse 61   Temp 36 °C (96.8 °F) (Temporal)   Resp 18       DASI Risk Score      Flowsheet Row Pre-Admission Testing from 11/27/2024 in [facility]   Can you take care of yourself (eat, dress, bathe, or use toilet)?  2.75 filed at 11/27/2024 0908   Can you walk indoors, such as around your house? 1.75 filed at 11/27/2024 0908   Can you walk a block or two on level ground?  2.75 filed at 11/27/2024 0908   Can you climb a flight of stairs or walk up a hill? 5.5 filed at 11/27/2024 0908   Can you run a short distance? 8 filed at 11/27/2024 0908   Can you do light work around the house like dusting or washing dishes? 2.7 filed at 11/27/2024 0908   Can you do moderate work around the house like vacuuming, sweeping floors or carrying groceries? 3.5 filed at 11/27/2024 0908   Can you do heavy work around the house like scrubbing floors or lifting and moving heavy furniture?  8 filed at 11/27/2024 0908   Can you do yard work like raking leaves, weeding or pushing a mower? 4.5 filed at 11/27/2024 0908   Can you have sexual relations? 5.25 filed at 11/27/2024 0908   Can you participate in moderate recreational activities like golf, bowling, dancing, doubles tennis or throwing a baseball or football? 6 filed at 11/27/2024 0908   Can you participate in strenous sports like swimming, singles tennis, football, basketball, or skiing? 0 filed at 11/27/2024 0908   DASI SCORE 50.7 filed at 11/27/2024 0908   METS Score (Will be calculated only when all the questions are answered) 9 filed at 11/27/2024 0908          Caprini DVT Assessment      Flowsheet Row Pre-Admission Testing from 11/27/2024 in [facility]   DVT Score 3 filed at 11/27/2024 0914   Surgical Factors Minor surgery planned filed at 11/27/2024 0914   Women Oral contraceptives filed at 11/27/2024 0914   BMI 30 or less filed at 11/27/2024 0914          Modified Frailty Index    No data to display       CHADS2 Stroke Risk  Current  as of about an hour ago        N/A 3 to 100%: High Risk   2 to < 3%: Medium Risk   0 to < 2%: Low Risk     Last Change: N/A          This score determines the patient's risk of having a stroke if the patient has atrial fibrillation.        This score is not applicable to this patient. Components are not calculated.          Revised Cardiac Risk Index      Flowsheet Row Pre-Admission Testing from 11/27/2024 in [facility]   High-Risk Surgery (Intraperitoneal, Intrathoracic,Suprainguinal vascular) 0 filed at 11/27/2024 0812   History of ischemic heart disease (History of MI, History of positive exercuse test, Current chest paint considered due to myocardial ischemia, Use of nitrate therapy, ECG with pathological Q Waves) 0 filed at 11/27/2024 0812   History of congestive heart failure (pulmonary edemia, bilateral rales or S3 gallop, Paroxysmal nocturnal dyspnea, CXR showing pulmonary vascular redistribution) 0 filed at 11/27/2024 0812   History of cerebrovascular disease (Prior TIA or stroke) 0 filed at 11/27/2024 0812   Pre-operative insulin treatment 0 filed at 11/27/2024 0812   Pre-operative creatinine>2 mg/dl 0 filed at 11/27/2024 0812   Revised Cardiac Risk Calculator 0 filed at 11/27/2024 0812          Apfel Simplified Score      Flowsheet Row Pre-Admission Testing from 11/27/2024 in [facility]   Smoking status 1 filed at 11/27/2024 0812   History of motion sickness or PONV  0 filed at 11/27/2024 0812   Use of postoperative opioids 1 filed at 11/27/2024 0812   Gender - Female 1=Yes filed at 11/27/2024 0812   Apfel Simplified Score Calculator 3 filed at 11/27/2024 0812          Risk Analysis Index Results This Encounter         11/27/2024  0908             Do you live in a place other than your own home?: 1    Any kidney failure, kidney not working well, or seeing a kidney doctor (nephrologist)? If yes, was this for kidney stones or another problem?: 0 No    Any history of chronic  (long-term) congestive heart failure (CHF)?: 0 No    Any shortness of breath when resting?: 0 No    In the past five years, have you been diagnosed with or treated for cancer?: No    During the last 3 months has it become difficult for you to remember things or organize your thoughts?: 0 No    Have you lost weight of 10 pounds or more in the past 3 months without trying?: 0 No    Do you have any loss of appetitie?: 0 No    Getting Around (Mobility): 0 Can get around without help    Eatin Can plan and prepare own meals    Toiletin Can use toilet without any help    Personal Hygiene (Bathing, Hand Washing, Changing Clothes): 0 Can shower or bathe without any help    URBAN Cancer History: Patient does not indicate history of cancer    Total Risk Analysis Index Score Without Cancer: 5    Total Risk Analysis Index Score: 5          Stop Bang Score      Flowsheet Row Pre-Admission Testing from 2024 in [facility]   Do you snore loudly? 0 filed at 2024 0908   Do you often feel tired or fatigued after your sleep? 0 filed at 2024 0908   Has anyone ever observed you stop breathing in your sleep? 0 filed at 2024 0908   Do you have or are you being treated for high blood pressure? 0 filed at 2024 0908   Recent BMI (Calculated) 22.9 filed at 2024 0908   Is BMI greater than 35 kg/m2? 0=No filed at 2024 0908   Age older than 50 years old? 0=No filed at 2024 0908   Is your neck circumference greater than 17 inches (Male) or 16 inches (Female)? 0 filed at 2024 0908   Gender - Male 0=No filed at 2024 0908   STOP-BANG Total Score 0 filed at 2024 0908          Prodigy: High Risk  Total Score: 0          ARISCAT Score for Postoperative Pulmonary Complications      Flowsheet Row Pre-Admission Testing from 2024 in [facility]   Age, years  0 filed at 2024 0812   Preoperative SpO2 0 filed at 2024 0812   Respiratory infection  in the last month Either upper or lower (i.e., URI, bronchitis, pneumonia), with fever and antibiotic treatment 0 filed at 11/27/2024 0812   Preoperative anemoa (Hgb less than 10 g/dl) 0 filed at 11/27/2024 0812   Surgical incision  0 filed at 11/27/2024 0812   Duration of surgery  0 filed at 11/27/2024 0812   Emergency Procedure  0 filed at 11/27/2024 0812   ARISCAT Total Score  0 filed at 11/27/2024 0812          Miguel Angel Perioperative Risk for Myocardial Infarction or Cardiac Arrest (JESSICA)      Flowsheet Row Pre-Admission Testing from 11/27/2024 in [facility]   Age 0.56 filed at 11/27/2024 0813   Functional Status  0 filed at 11/27/2024 0813   ASA Class  -5.17 filed at 11/27/2024 0813   Creatinine 0 filed at 11/27/2024 0813   Type of Procedure  0.71 filed at 11/27/2024 0813   JESSICA Total Score  -9.17 filed at 11/27/2024 0813   JESSICA % 0.01 filed at 11/27/2024 0813            Assessment and Plan:     HPI 28 y/o male scheduled for total cavity excision on 12/6/2024 with  Dr. Patrick secondary to deviated nasal septum.  PMHX includes ADHD.  PAT is consulted today for perioperative risk stratification and optimization.      Neuro:  No neurologic diagnosis or significant findings on chart review, clinical presentation and evaluation.  No grossly apparent neurologic perioperative risk.    Patient is not at increased risk for perioperative CVA    Psych:  ADHD - Holding Adderall the day of procedure    HEENT:  Follows with Dr. Patrick.  Last seen 8/29/2024 for deviated nasal septum  Plan for repair septum nasal cavity excision nasal turbinate    Cardiovascular:  No CV diagnosis or significant findings on chart review or clinical presentation and evaluation. No further preoperative testing or intervention is indicated at this time.  METS: 9  RCRI: 0 points, 3.9%  risk for postoperative MACE     Pulmonary:  No pulmonary diagnosis or significant findings on chart review or clinical presentation.  No further  preoperative testing is indicated at this time.  Stop Bang score is 0 placing patient at low risk for GENEVA  PRODIGY: Low risk for opioid induced respiratory depression      Renal:   No renal diagnosis or significant findings on chart review, clinical presentation or evaluation. No further preoperative testing/intervention is indicated at this time.    Endocrine:  No endocrine diagnosis or significant findings on chart review or clinical presentation and evaluation. No further testing or intervention is indicated at this time.    Hematologic:  No hematologic diagnosis, however patient is at an increased risk for DVT  Caprini Score 3, patient at High risk for perioperative DVT.  Patient provided with VTE education/handout.    Gastrointestinal:   No GI diagnosis or significant findings on chart review or clinical presentation and evaluation.   Apfel 3    Infectious disease:   No infectious diagnosis or significant findings on chart review or clinical presentation and evaluation.     Musculoskeletal:   No diagnosis or significant findings on chart review or clinical presentation and evaluation.     Anesthesia/Airway:  No anesthesia complications      Medication instructions and NPO guidelines reviewed with the patient.  All questions or concerns discussed and addressed.      Labs ordered

## 2024-11-27 NOTE — PREPROCEDURE INSTRUCTIONS
Thank you for visiting Preadmission Testing (PAT) today for your pre-procedure evaluation, you were seen by     Adriana Weiss CNP  Pre Admission Testing  [facility]  [phone]    This summary includes instructions and information to aid you during your perioperative period.  Please read carefully. If you have any questions about your visit today, please call the number listed above.  If you become ill or have any changes to your health before your surgery, please contact your primary care provider and alert your surgeon.        Preparing for your Surgery       Exercises  Preoperative Deep Breathing Exercises  Why it is important to do deep breathing exercises before my surgery?  Deep breathing exercises strengthen your breathing muscles.  This helps you to recover after your surgery and decreases the chance of breathing complications.  How are the deep breathing exercises done?  Sit straight with your back supported.  Breathe in deeply and slowly through your nose. Your lower rib cage should expand and your abdomen may move forward.  Hold that breath for 3 to 5 seconds.  Breathe out through pursed lips, slowly and completely.  Rest and repeat 10 times every hour while awake.  Rest longer if you become dizzy or lightheaded.       Preoperative Brain Exercises    What are brain exercises?  A brain exercise is any activity that engages your thinking (cognitive) skills.    What types of activities are considered brain exercises?  Jigsaw puzzles, crossword puzzles, word jumble, memory games, word search, and many more.  Many can be found free online or on your phone via a mobile arnold.    Why should I do brain exercises before my surgery?  More recent research has shown brain exercise before surgery can lower the risk of postoperative delirium (confusion) which can be especially important for older adults.  Patients who did brain exercises for 5 to 10 hours the days before surgery, cut their  risk of postoperative delirium in half up to 1 week after surgery.    Sit-to-Stand Exercise    What is the sit-to-stand exercise?  The sit-to-stand exercise strengthens the muscles of your lower body and muscles in the center of your body (core muscles for stability) helping to maintain and improve your strength and mobility.  How do I do the sit-to-stand exercise?  The goal is to do this exercise without using your arms or hands.  If this is too difficult, use your arms and hands or a chair with armrests to help slowly push yourself to the standing position and lower yourself back to the sitting position. As the movement becomes easier use your arms and hands less.    Steps to the sit-to-stand exercise  Sit up tall in a sturdy chair, knees bent, feet flat on the floor shoulder-width apart.  Shift your hips/pelvis forward in the chair to correctly position yourself for the next movement.  Lean forward at your hips.  Stand up straight putting equal weight on both feet.  Check to be sure you are properly aligned with the chair, in a slow controlled movement sit back down.  Repeat this exercise 10-15 times.  If needed you can do it fewer times until your strength improves.  Rest for 1 minute.  Do another 10-15 sit-to-stand exercises.  Try to do this in the morning and evening.        Instructions    Preoperative Fasting Guidelines    Why must I stop eating and drinking near surgery time?  With sedation, food or liquid in your stomach can enter your lungs causing serious complications  Food can increase nausea and vomiting  When do I need to stop eating and drinking before my surgery?      Do not eat any food after midnight the night before your surgery/procedure. You may have up to 13.5 ounces of clear liquid until TWO hours before your instructed arrival time to the hospital.  This includes water, black tea/coffee, (no milk or cream) apple juice, and electrolyte drinks (Gatorade). You may chew gum until TWO hours  before your surgery/procedure       Simple things you can do to help prevent blood clots     Blood clots are blockages that can form in the body's veins. When a blood clot forms in your deep veins, it may be called a deep vein thrombosis, or DVT for short. Blood clots can happen in any part of the body where blood flows, but they are most common in the arms and legs. If a piece of a blood clot breaks free and travels to the lungs, it is called a pulmonary embolus (PE). A PE can be a very serious problem.         Being in the hospital or having surgery can raise your chances of getting a blood clot because you may not be well enough to move around as much as you normally do.         Ways you can help prevent blood clots in the hospital       Wearing SCDs  SCDs stands for Sequential Compression Devices.   SCDs are special sleeves that wrap around your legs. They attach to a pump that fills them with air to gently squeeze your legs every few minutes.  This helps return the blood in your legs to your heart.   SCDs should only be taken off when walking or bathing. SCDs may not be comfortable, but they can help save your life.              Pump SCD leg sleeves  Wearing compression stockings - if your doctor orders them. These special snug-fitting stockings gently squeeze your legs to help blood flow.       Walking. Walking helps move the blood in your legs.   If your doctor says it is ok, try walking the halls at least   5 times a day. Ask us to help you get up, so you don't fall.      Taking any blood-thinning medicines your doctor orders.              Ways you can help prevent blood clots at home         Wearing compression stockings - if your doctor orders them.   Walking - to help move the blood in your legs.    Taking any blood-thinning medicines your doctor orders.      Signs of a blood clot or PE    Tell your doctor or nurse right away if you have any of the problems listed below.         If you are at home, seek  medical care right away. Call 911 for chest pain or problems breathing.            Signs of a blood clot (DVT) - such as pain, swelling, redness, or warmth in your arm or legs.  Signs of a pulmonary embolism (PE) - such as chest pain or feeling short of breath      Tobacco and Alcohol;  Do not drink alcohol or smoke within 24 hours of surgery.  It is best to quit smoking for as long as possible before any surgery or procedure.      The Week before Surgery        Seven days before Surgery  Check your PAT medication instructions  Do the exercises provided to you by PAT  Arrange for a responsible, adult licensed  to take you home after surgery and stay with you for 24 hours.  You will not be permitted to drive yourself home if you have received any anesthetic/sedation  Six days before surgery  Check your PAT medication instructions  Do the exercises provided to you by PAT  Start using Chlorhexidene (CHG) body wash if prescribed  Five days before surgery  Check your PAT medication instructions  Do the exercises provided to you by PAT  Continue to use CHG body wash if prescribed  Three days before surgery  Check your PAT medication instructions  Do the exercises provided to you by PAT  Continue to use CHG body wash if prescribed  Two days before surgery  Check your PAT medication instructions  Do the exercises provided to you by PAT  Continue to use CHG body wash if prescribed    The Day before Surgery       Check your PAT medication and all other PAT instructions including when to stop eating and drinking  You will be called with your arrival time for surgery in the late afternoon.  If you do not receive a call please reach out to [facility]. [phone]  Do not smoke or drink 24 hours before surgery  Prepare items to bring with you to the hospital  Shower with your chlorhexidine wash if prescribed  Brush your teeth and use your chlorhexidine dental rinse if prescribed    The Day of Surgery       Check your  PAT medication instructions  Ensure you follow the instructions for when to stop eating and drinking  Shower, if prescribed use CHG.  Do not apply any lotions, creams, moisturizers, perfume or deodorant  Brush your teeth and use your CHG dental rinse if prescribed  Wear loose comfortable clothing  Avoid make-up  Remove  jewelry and piercings, consider professional piercing removal with a plastic spacer if needed  Bring photo ID and Insurance card  Bring an accurate medication list that includes medication dose, frequency and allergies  Bring a copy of your advanced directives (will, health care power of )  Bring any devices and controllers as well as medical devices you have been provided with for surgery (CPAP, slings, braces, etc.)  Dentures, eyeglasses, and contacts will be removed before surgery, please bring cases for contacts or glasses

## 2024-12-06 ENCOUNTER — ANESTHESIA (OUTPATIENT)
Dept: OPERATING ROOM | Facility: HOSPITAL | Age: 29
End: 2024-12-06
Payer: COMMERCIAL

## 2024-12-06 ENCOUNTER — PHARMACY VISIT (OUTPATIENT)
Dept: PHARMACY | Facility: CLINIC | Age: 29
End: 2024-12-06
Payer: MEDICARE

## 2024-12-06 ENCOUNTER — HOSPITAL ENCOUNTER (OUTPATIENT)
Facility: HOSPITAL | Age: 29
Setting detail: OUTPATIENT SURGERY
Discharge: HOME | End: 2024-12-06
Attending: OTOLARYNGOLOGY | Admitting: OTOLARYNGOLOGY
Payer: COMMERCIAL

## 2024-12-06 VITALS
BODY MASS INDEX: 21.3 KG/M2 | RESPIRATION RATE: 15 BRPM | HEIGHT: 65 IN | SYSTOLIC BLOOD PRESSURE: 125 MMHG | HEART RATE: 67 BPM | WEIGHT: 127.87 LBS | TEMPERATURE: 97.9 F | OXYGEN SATURATION: 100 % | DIASTOLIC BLOOD PRESSURE: 80 MMHG

## 2024-12-06 DIAGNOSIS — J34.2 DEVIATED NASAL SEPTUM: Primary | ICD-10-CM

## 2024-12-06 DIAGNOSIS — J34.3 HYPERTROPHY OF BOTH INFERIOR NASAL TURBINATES: ICD-10-CM

## 2024-12-06 LAB — PREGNANCY TEST URINE, POC: NEGATIVE

## 2024-12-06 PROCEDURE — 2500000005 HC RX 250 GENERAL PHARMACY W/O HCPCS

## 2024-12-06 PROCEDURE — RXMED WILLOW AMBULATORY MEDICATION CHARGE

## 2024-12-06 PROCEDURE — 2500000004 HC RX 250 GENERAL PHARMACY W/ HCPCS (ALT 636 FOR OP/ED): Performed by: OTOLARYNGOLOGY

## 2024-12-06 PROCEDURE — 2500000004 HC RX 250 GENERAL PHARMACY W/ HCPCS (ALT 636 FOR OP/ED)

## 2024-12-06 PROCEDURE — 3600000008 HC OR TIME - EACH INCREMENTAL 1 MINUTE - PROCEDURE LEVEL THREE: Performed by: OTOLARYNGOLOGY

## 2024-12-06 PROCEDURE — 3600000003 HC OR TIME - INITIAL BASE CHARGE - PROCEDURE LEVEL THREE: Performed by: OTOLARYNGOLOGY

## 2024-12-06 PROCEDURE — 7100000010 HC PHASE TWO TIME - EACH INCREMENTAL 1 MINUTE: Performed by: OTOLARYNGOLOGY

## 2024-12-06 PROCEDURE — 2720000007 HC OR 272 NO HCPCS: Performed by: OTOLARYNGOLOGY

## 2024-12-06 PROCEDURE — 7100000001 HC RECOVERY ROOM TIME - INITIAL BASE CHARGE: Performed by: OTOLARYNGOLOGY

## 2024-12-06 PROCEDURE — 2500000005 HC RX 250 GENERAL PHARMACY W/O HCPCS: Performed by: OTOLARYNGOLOGY

## 2024-12-06 PROCEDURE — 2500000004 HC RX 250 GENERAL PHARMACY W/ HCPCS (ALT 636 FOR OP/ED): Performed by: ANESTHESIOLOGY

## 2024-12-06 PROCEDURE — 81025 URINE PREGNANCY TEST: CPT | Performed by: OTOLARYNGOLOGY

## 2024-12-06 PROCEDURE — 3700000001 HC GENERAL ANESTHESIA TIME - INITIAL BASE CHARGE: Performed by: OTOLARYNGOLOGY

## 2024-12-06 PROCEDURE — 7100000002 HC RECOVERY ROOM TIME - EACH INCREMENTAL 1 MINUTE: Performed by: OTOLARYNGOLOGY

## 2024-12-06 PROCEDURE — 7100000009 HC PHASE TWO TIME - INITIAL BASE CHARGE: Performed by: OTOLARYNGOLOGY

## 2024-12-06 PROCEDURE — 30140 RESECT INFERIOR TURBINATE: CPT | Performed by: OTOLARYNGOLOGY

## 2024-12-06 PROCEDURE — 2500000002 HC RX 250 W HCPCS SELF ADMINISTERED DRUGS (ALT 637 FOR MEDICARE OP, ALT 636 FOR OP/ED): Performed by: ANESTHESIOLOGY

## 2024-12-06 PROCEDURE — 2500000001 HC RX 250 WO HCPCS SELF ADMINISTERED DRUGS (ALT 637 FOR MEDICARE OP): Performed by: OTOLARYNGOLOGY

## 2024-12-06 PROCEDURE — 3700000002 HC GENERAL ANESTHESIA TIME - EACH INCREMENTAL 1 MINUTE: Performed by: OTOLARYNGOLOGY

## 2024-12-06 PROCEDURE — 30520 REPAIR OF NASAL SEPTUM: CPT | Performed by: OTOLARYNGOLOGY

## 2024-12-06 RX ORDER — SODIUM CHLORIDE 0.9 G/100ML
IRRIGANT IRRIGATION AS NEEDED
Status: DISCONTINUED | OUTPATIENT
Start: 2024-12-06 | End: 2024-12-06 | Stop reason: HOSPADM

## 2024-12-06 RX ORDER — HYDROMORPHONE HYDROCHLORIDE 2 MG/ML
INJECTION, SOLUTION INTRAMUSCULAR; INTRAVENOUS; SUBCUTANEOUS AS NEEDED
Status: DISCONTINUED | OUTPATIENT
Start: 2024-12-06 | End: 2024-12-06

## 2024-12-06 RX ORDER — APREPITANT 40 MG/1
40 CAPSULE ORAL ONCE
Status: COMPLETED | OUTPATIENT
Start: 2024-12-06 | End: 2024-12-06

## 2024-12-06 RX ORDER — ROCURONIUM BROMIDE 10 MG/ML
INJECTION, SOLUTION INTRAVENOUS AS NEEDED
Status: DISCONTINUED | OUTPATIENT
Start: 2024-12-06 | End: 2024-12-06

## 2024-12-06 RX ORDER — OXYCODONE HYDROCHLORIDE 5 MG/1
5 TABLET ORAL EVERY 4 HOURS PRN
Status: DISCONTINUED | OUTPATIENT
Start: 2024-12-06 | End: 2024-12-06 | Stop reason: HOSPADM

## 2024-12-06 RX ORDER — SODIUM CHLORIDE, SODIUM LACTATE, POTASSIUM CHLORIDE, CALCIUM CHLORIDE 600; 310; 30; 20 MG/100ML; MG/100ML; MG/100ML; MG/100ML
100 INJECTION, SOLUTION INTRAVENOUS CONTINUOUS
Status: DISCONTINUED | OUTPATIENT
Start: 2024-12-06 | End: 2024-12-06 | Stop reason: HOSPADM

## 2024-12-06 RX ORDER — ONDANSETRON HYDROCHLORIDE 2 MG/ML
INJECTION, SOLUTION INTRAVENOUS AS NEEDED
Status: DISCONTINUED | OUTPATIENT
Start: 2024-12-06 | End: 2024-12-06

## 2024-12-06 RX ORDER — FENTANYL CITRATE 50 UG/ML
INJECTION, SOLUTION INTRAMUSCULAR; INTRAVENOUS AS NEEDED
Status: DISCONTINUED | OUTPATIENT
Start: 2024-12-06 | End: 2024-12-06

## 2024-12-06 RX ORDER — ONDANSETRON HYDROCHLORIDE 2 MG/ML
4 INJECTION, SOLUTION INTRAVENOUS ONCE AS NEEDED
Status: DISCONTINUED | OUTPATIENT
Start: 2024-12-06 | End: 2024-12-06 | Stop reason: HOSPADM

## 2024-12-06 RX ORDER — DEXMEDETOMIDINE IN 0.9 % NACL 20 MCG/5ML
SYRINGE (ML) INTRAVENOUS AS NEEDED
Status: DISCONTINUED | OUTPATIENT
Start: 2024-12-06 | End: 2024-12-06

## 2024-12-06 RX ORDER — LIDOCAINE HYDROCHLORIDE AND EPINEPHRINE 10; 10 UG/ML; MG/ML
INJECTION, SOLUTION INFILTRATION; PERINEURAL AS NEEDED
Status: DISCONTINUED | OUTPATIENT
Start: 2024-12-06 | End: 2024-12-06 | Stop reason: HOSPADM

## 2024-12-06 RX ORDER — ACETAMINOPHEN 10 MG/ML
INJECTION, SOLUTION INTRAVENOUS AS NEEDED
Status: DISCONTINUED | OUTPATIENT
Start: 2024-12-06 | End: 2024-12-06

## 2024-12-06 RX ORDER — SODIUM CHLORIDE, SODIUM LACTATE, POTASSIUM CHLORIDE, CALCIUM CHLORIDE 600; 310; 30; 20 MG/100ML; MG/100ML; MG/100ML; MG/100ML
20 INJECTION, SOLUTION INTRAVENOUS CONTINUOUS
Status: DISCONTINUED | OUTPATIENT
Start: 2024-12-06 | End: 2024-12-06 | Stop reason: HOSPADM

## 2024-12-06 RX ORDER — ALBUTEROL SULFATE 0.83 MG/ML
2.5 SOLUTION RESPIRATORY (INHALATION) ONCE AS NEEDED
Status: DISCONTINUED | OUTPATIENT
Start: 2024-12-06 | End: 2024-12-06 | Stop reason: HOSPADM

## 2024-12-06 RX ORDER — MUPIROCIN 20 MG/G
OINTMENT TOPICAL AS NEEDED
Status: DISCONTINUED | OUTPATIENT
Start: 2024-12-06 | End: 2024-12-06 | Stop reason: HOSPADM

## 2024-12-06 RX ORDER — MIDAZOLAM HYDROCHLORIDE 1 MG/ML
INJECTION INTRAMUSCULAR; INTRAVENOUS AS NEEDED
Status: DISCONTINUED | OUTPATIENT
Start: 2024-12-06 | End: 2024-12-06

## 2024-12-06 RX ORDER — PROPOFOL 10 MG/ML
INJECTION, EMULSION INTRAVENOUS AS NEEDED
Status: DISCONTINUED | OUTPATIENT
Start: 2024-12-06 | End: 2024-12-06

## 2024-12-06 RX ORDER — MEPERIDINE HYDROCHLORIDE 25 MG/ML
12.5 INJECTION INTRAMUSCULAR; INTRAVENOUS; SUBCUTANEOUS EVERY 10 MIN PRN
Status: DISCONTINUED | OUTPATIENT
Start: 2024-12-06 | End: 2024-12-06 | Stop reason: HOSPADM

## 2024-12-06 RX ORDER — LIDOCAINE HCL/PF 100 MG/5ML
SYRINGE (ML) INTRAVENOUS AS NEEDED
Status: DISCONTINUED | OUTPATIENT
Start: 2024-12-06 | End: 2024-12-06

## 2024-12-06 RX ORDER — LIDOCAINE HYDROCHLORIDE 40 MG/ML
SOLUTION TOPICAL AS NEEDED
Status: DISCONTINUED | OUTPATIENT
Start: 2024-12-06 | End: 2024-12-06

## 2024-12-06 RX ORDER — DROPERIDOL 2.5 MG/ML
0.62 INJECTION, SOLUTION INTRAMUSCULAR; INTRAVENOUS ONCE AS NEEDED
Status: DISCONTINUED | OUTPATIENT
Start: 2024-12-06 | End: 2024-12-06 | Stop reason: HOSPADM

## 2024-12-06 RX ORDER — HYDROCODONE BITARTRATE AND ACETAMINOPHEN 7.5; 325 MG/1; MG/1
1 TABLET ORAL EVERY 6 HOURS PRN
Qty: 8 TABLET | Refills: 0 | Status: SHIPPED | OUTPATIENT
Start: 2024-12-06

## 2024-12-06 RX ORDER — DIPHENHYDRAMINE HYDROCHLORIDE 50 MG/ML
12.5 INJECTION INTRAMUSCULAR; INTRAVENOUS ONCE AS NEEDED
Status: DISCONTINUED | OUTPATIENT
Start: 2024-12-06 | End: 2024-12-06 | Stop reason: HOSPADM

## 2024-12-06 RX ORDER — OXYMETAZOLINE HCL 0.05 %
SPRAY, NON-AEROSOL (ML) NASAL AS NEEDED
Status: DISCONTINUED | OUTPATIENT
Start: 2024-12-06 | End: 2024-12-06 | Stop reason: HOSPADM

## 2024-12-06 RX ORDER — AZITHROMYCIN 250 MG/1
TABLET, FILM COATED ORAL
Qty: 6 TABLET | Refills: 0 | Status: SHIPPED | OUTPATIENT
Start: 2024-12-06 | End: 2024-12-11

## 2024-12-06 RX ADMIN — SODIUM CHLORIDE, POTASSIUM CHLORIDE, SODIUM LACTATE AND CALCIUM CHLORIDE 20 ML/HR: 600; 310; 30; 20 INJECTION, SOLUTION INTRAVENOUS at 08:31

## 2024-12-06 RX ADMIN — HYDROMORPHONE HYDROCHLORIDE 0.5 MG: 1 INJECTION, SOLUTION INTRAMUSCULAR; INTRAVENOUS; SUBCUTANEOUS at 11:10

## 2024-12-06 SDOH — HEALTH STABILITY: MENTAL HEALTH: CURRENT SMOKER: 0

## 2024-12-06 ASSESSMENT — PAIN - FUNCTIONAL ASSESSMENT
PAIN_FUNCTIONAL_ASSESSMENT: 0-10
PAIN_FUNCTIONAL_ASSESSMENT: UNABLE TO SELF-REPORT

## 2024-12-06 ASSESSMENT — PAIN SCALES - GENERAL
PAINLEVEL_OUTOF10: 0 - NO PAIN
PAIN_LEVEL: 2
PAINLEVEL_OUTOF10: 0 - NO PAIN
PAINLEVEL_OUTOF10: 7
PAINLEVEL_OUTOF10: 5 - MODERATE PAIN

## 2024-12-06 NOTE — ANESTHESIA POSTPROCEDURE EVALUATION
Patient: Nell Almodovar    Procedure Summary       Date: 12/06/24 Room / Location: [facility] OR 01 / [facility]    Anesthesia Start: 0921 Anesthesia Stop: 1026    Procedures:       REPAIR SEPTUM NASAL CAVITY EXCISION NASAL TURBINATES (Nose)      EXCISION NASAL TURBINATE (Bilateral: Nose) Diagnosis:       Deviated nasal septum      Hypertrophy of both inferior nasal turbinates      (Deviated nasal septum [J34.2])      (Hypertrophy of both inferior nasal turbinates [J34.3])    Surgeons: Moe Patrick MD Responsible Provider: Adam Buchanan MD    Anesthesia Type: general ASA Status: 2            Anesthesia Type: general    Vitals Value Taken Time   /85 12/06/24 1100   Temp  12/06/24 1445   Pulse 56 12/06/24 1100   Resp 13 12/06/24 1100   SpO2 100 % 12/06/24 1100       Anesthesia Post Evaluation    Patient location during evaluation: PACU  Patient participation: complete - patient participated  Level of consciousness: awake  Pain score: 2  Pain management: adequate  Multimodal analgesia pain management approach  Airway patency: patent  Two or more strategies used to mitigate risk of obstructive sleep apnea  Cardiovascular status: acceptable  Respiratory status: acceptable  Hydration status: acceptable  Postoperative Nausea and Vomiting: none      There were no known notable events for this encounter.

## 2024-12-06 NOTE — OP NOTE
REPAIR SEPTUM NASAL CAVITY EXCISION NASAL TURBINATES, EXCISION NASAL TURBINATE (B) Operative Note     Date: 2024  OR Location: [facility]    Name: Nell Almodovar YOB: 1995, Age: 29 y.o., MRN: [id number], Sex: female    Diagnosis  Pre-op Diagnosis      * Deviated nasal septum [J34.2]     * Hypertrophy of both inferior nasal turbinates [J34.3] Post-op Diagnosis     * Deviated nasal septum [J34.2]     * Hypertrophy of both inferior nasal turbinates [J34.3]     Procedures  REPAIR SEPTUM NASAL CAVITY EXCISION NASAL TURBINATES  06667 - MN SEPTOPLASTY/SUBMUCOUS RESECJ W/WO CARTILAGE GRF    EXCISION NASAL TURBINATE  36833 - MN SUBMUCOUS RESCJ INFERIOR TURBINATE PRTL/COMPL      Surgeons      * Moe Patrick - Primary    Resident/Fellow/Other Assistant:  Surgeons and Role:  * No surgeons found with a matching role *    Staff:   Circulator: Sheridan  Scrub Person: Alysia Arechiga Scrub: Aroldo    Anesthesia Staff: Anesthesiologist: Adam Buchanan MD  CRNA: PETE uY-GALO  SRNA: Nora Rosales    Procedure Summary  Anesthesia: General  ASA: II  Estimated Blood Loss: 25 mL  Intra-op Medications:   Administrations occurring from 0845 to 1015 on 24:   Medication Name Total Dose   mupirocin (Bactroban) 2 % ointment 1 Application   sodium chloride 0.9 % irrigation solution 500 mL   oxymetazoline (Afrin) 0.05 % nasal spray 2 spray   lidocaine-epinephrine (Xylocaine W/EPI) 1 %-1:100,000 injection 8 mL   lactated Ringer's infusion Cannot be calculated   aprepitant (Emend) capsule 40 mg 40 mg   acetaminophen (Ofirmev) injection 1,000 mg   dexAMETHasone (Decadron) injection 4 mg/mL 8 mg   dexMEDETOMidine 4 mcg/mL in NS syringe 16 mcg   fentaNYL (Sublimaze) injection 50 mcg/mL 100 mcg   HYDROmorphone (Dilaudid) injection 2 mg/mL 0.5 mg   lidocaine (cardiac) injection 2% prefilled syringe 60 mg   lidocaine (LTA Kit) for intubation 4 mL   midazolam PF (Versed) injection 1 mg/mL 2 mg   ondansetron (Zofran) 2 mg/mL  injection 4 mg   propofol (Diprivan) injection 10 mg/mL 285.9 mg   rocuronium 10 mg/mL 60 mg   sugammadex (Bridion) 200 mg/2 mL injection 200 mg              Anesthesia Record               Intraprocedure I/O Totals          Intake    lactated Ringer's infusion 900.00 mL    Total Intake 900 mL          Specimen: No specimens collected              Drains and/or Catheters: * None in log *    Tourniquet Times:         Implants:     Findings: Severe deviation of the septum to the left side and enlargement of the inferior turbinates    Indications: Nell Almodovar is an 29 y.o. female who is having surgery for Deviated nasal septum [J34.2]  Hypertrophy of both inferior nasal turbinates [J34.3].     The patient was seen in the preoperative area. The risks, benefits, complications, treatment options, non-operative alternatives, expected recovery and outcomes were discussed with the patient. The possibilities of reaction to medication, pulmonary aspiration, injury to surrounding structures, bleeding, recurrent infection, the need for additional procedures, failure to diagnose a condition, and creating a complication requiring transfusion or operation were discussed with the patient. The patient concurred with the proposed plan, giving informed consent.  The site of surgery was properly noted/marked if necessary per policy. The patient has been actively warmed in preoperative area. Preoperative antibiotics are not indicated. Venous thrombosis prophylaxis are not indicated.    Procedure Details: The patient was taken to the operating room and placed in the supine position.  1% lidocaine with 1/100,000 of epinephrine was injected into the mucoperichondrium of the septum bilaterally.  A left hemitransfixion incision was performed and the left mucoperichondrium of the septum was elevated.  The right side of the septal cartilage was then dissected while preserving 1 cm of caudal and dorsal septum.  The subluxed portion of the  septum inferiorly was resected using a [place].  A large piece of septal cartilage was then harvested using a swivel knife.  The deviated bony septum posteriorly and superiorly was resected using a double-action forceps.  Inferior tunnels were developed and the deviated maxillary crest was resected inferiorly using a chisel and mallet.  The harvested piece of cartilage was then thinned and reinserted at the midline between the mucoperichondrial flaps.  The incision was closed using 4-0 chromic sutures.  Silastic splints with Bactroban were placed on both sides of the septum and were immobilized using a 2-0 Prolene suture.  The left inferior turbinate was infractured and subsequently outfractured.  A stab incision was performed at the tip of the turbinate and a 2.9 mm microdebrider blade was inserted submucosally and the anterior 1.5 cm of the turbinate bone was submucosally resected into the desired shape and size.  Hemostasis was achieved using the insulated bipolar cautery at the tip of the turbinate and along the length of the turbinate at 15 W.  A similar procedure was performed on the right side.  Bilateral Merocel 2000 packings with Bactroban were inserted for hemostasis.  The oral cavity was irrigated and suctioned and there was no evidence of bleeding noted and the patient was awakened and extubated and returned to recovery room in stable condition and there were no complications.    Complications:  None; patient tolerated the procedure well.    Disposition: PACU - hemodynamically stable.  Condition: stable                 Additional Details:     Attending Attestation: I was present and scrubbed for the entire procedure.    Moe Patrick  Phone Number: [phone]       47.6

## 2024-12-06 NOTE — ANESTHESIA PROCEDURE NOTES
Airway  Date/Time: 12/6/2024 9:32 AM  Urgency: elective    Airway not difficult    Staffing  Performed: SRNA   Authorized by: Adam Buchanan MD    Performed by: Nora Rosales  Patient location during procedure: OR    Indications and Patient Condition  Indications for airway management: anesthesia  Spontaneous Ventilation: absent  Sedation level: deep  Preoxygenated: yes  Patient position: sniffing  MILS maintained throughout  Mask difficulty assessment: 1 - vent by mask  Planned trial extubation    Final Airway Details  Final airway type: endotracheal airway      Successful airway: ETT  Cuffed: yes   Successful intubation technique: video laryngoscopy  Facilitating devices/methods: intubating stylet  Endotracheal tube insertion site: oral  Blade: Samia  Blade size: #3  ETT size (mm): 7.0  Cormack-Lehane Classification: grade I - full view of glottis  Placement verified by: capnometry and palpation of cuff   Measured from: lips  ETT to lips (cm): 22  Number of attempts at approach: 1    Additional Comments  Cervantes used

## 2024-12-06 NOTE — ANESTHESIA PREPROCEDURE EVALUATION
Patient: Nell Almodovar    Procedure Information       Date/Time: 12/06/24 0845    Procedures:       REPAIR SEPTUM NASAL CAVITY EXCISION NASAL TURBINATES - 1 hour      EXCISION NASAL TURBINATE (Bilateral)    Location: [facility] OR [facility] / [facility]    Surgeons: Moe Patrick MD            Relevant Problems   Anesthesia (within normal limits)      Cardiac (within normal limits)      Pulmonary (within normal limits)      Neuro (within normal limits)  ADHD      GI (within normal limits)      /Renal (within normal limits)      Liver (within normal limits)      Endocrine (within normal limits)      Hematology (within normal limits)      Musculoskeletal (within normal limits)      HEENT (within normal limits)      ID (within normal limits)      Skin (within normal limits)      GYN (within normal limits)       Clinical information reviewed:   Tobacco  Allergies  Meds   Med Hx  Surg Hx  OB Status  Fam Hx          NPO Detail:  NPO/Void Status  Date of Last Liquid: 12/05/24  Time of Last Liquid: 2100  Date of Last Solid: 12/05/24  Time of Last Solid: 2100         Physical Exam    Airway  Mallampati: II  TM distance: >3 FB  Neck ROM: full     Cardiovascular - normal exam     Dental - normal exam     Pulmonary - normal exam     Abdominal - normal exam           Anesthesia Plan    History of general anesthesia?: yes  History of complications of general anesthesia?: no    ASA 2     general   (DT risk factors Emend administered preop)  The patient is not a current smoker.    Anesthetic plan and risks discussed with patient.  Use of blood products discussed with patient who consented to blood products.    Plan discussed with CRNA and attending.

## 2024-12-07 ENCOUNTER — APPOINTMENT (OUTPATIENT)
Dept: OTOLARYNGOLOGY | Facility: CLINIC | Age: 29
End: 2024-12-07
Payer: COMMERCIAL

## 2024-12-07 VITALS — TEMPERATURE: 97 F | HEIGHT: 65 IN | WEIGHT: 127 LBS | BODY MASS INDEX: 21.16 KG/M2

## 2024-12-07 DIAGNOSIS — J34.2 DEVIATED NASAL SEPTUM: Primary | ICD-10-CM

## 2024-12-07 DIAGNOSIS — J34.3 HYPERTROPHY OF NASAL TURBINATES: ICD-10-CM

## 2024-12-07 PROCEDURE — 3008F BODY MASS INDEX DOCD: CPT | Performed by: OTOLARYNGOLOGY

## 2024-12-07 PROCEDURE — 1036F TOBACCO NON-USER: CPT | Performed by: OTOLARYNGOLOGY

## 2024-12-07 PROCEDURE — 99024 POSTOP FOLLOW-UP VISIT: CPT | Performed by: OTOLARYNGOLOGY

## 2024-12-07 NOTE — PROGRESS NOTES
Subjective   Patient ID: Nell Almodovar is a 29 y.o. female  HPI  Patient presents for follow-up status post septoplasty and bilateral turbinoplasty.  Review of Systems    Objective   Physical Exam  The nasal packings were carefully removed.  The splints are midline.  There is no bleeding or hematoma.  Assessment/Plan   Diagnoses and all orders for this visit:  Deviated nasal septum (Primary)  Hypertrophy of nasal turbinates     Doing well status post septoplasty and bilateral turbinoplasty for removal of the packings.  She was advised to start nasal saline sprays and she will follow-up in 2 to 3 days for removal of the splints.

## 2024-12-10 ENCOUNTER — APPOINTMENT (OUTPATIENT)
Dept: OTOLARYNGOLOGY | Facility: CLINIC | Age: 29
End: 2024-12-10
Payer: COMMERCIAL

## 2024-12-10 VITALS — WEIGHT: 127 LBS | BODY MASS INDEX: 21.16 KG/M2 | HEIGHT: 65 IN

## 2024-12-10 DIAGNOSIS — J34.2 DEVIATED NASAL SEPTUM: Primary | ICD-10-CM

## 2024-12-10 DIAGNOSIS — J34.3 HYPERTROPHY OF NASAL TURBINATES: ICD-10-CM

## 2024-12-10 PROCEDURE — 3008F BODY MASS INDEX DOCD: CPT | Performed by: OTOLARYNGOLOGY

## 2024-12-10 PROCEDURE — 1036F TOBACCO NON-USER: CPT | Performed by: OTOLARYNGOLOGY

## 2024-12-10 PROCEDURE — 99024 POSTOP FOLLOW-UP VISIT: CPT | Performed by: OTOLARYNGOLOGY

## 2024-12-10 NOTE — PROGRESS NOTES
Subjective   Patient ID: Nell Almodovar is a 29 y.o. female  HPI  Patient presents for follow-up status post septoplasty and bilateral turbinoplasty.  Review of Systems    Objective   Physical Exam  The nasal splints were carefully removed.  The septum is midline.  There is no bleeding or hematoma.  Assessment/Plan   Diagnoses and all orders for this visit:  Deviated nasal septum (Primary)  Hypertrophy of nasal turbinates     Doing well status post septoplasty and bilateral turbinoplasty following removal of the nasal splints.  She was advised to continue the nasal saline sprays and she will follow-up in 3 weeks for final debridement.

## 2024-12-24 ENCOUNTER — APPOINTMENT (OUTPATIENT)
Dept: OTOLARYNGOLOGY | Facility: CLINIC | Age: 29
End: 2024-12-24
Payer: COMMERCIAL

## 2024-12-24 VITALS — HEIGHT: 65 IN | BODY MASS INDEX: 21.16 KG/M2 | WEIGHT: 127 LBS

## 2024-12-24 DIAGNOSIS — J34.3 HYPERTROPHY OF NASAL TURBINATES: ICD-10-CM

## 2024-12-24 DIAGNOSIS — J34.2 DEVIATED NASAL SEPTUM: Primary | ICD-10-CM

## 2024-12-24 PROCEDURE — 1036F TOBACCO NON-USER: CPT | Performed by: OTOLARYNGOLOGY

## 2024-12-24 PROCEDURE — 99024 POSTOP FOLLOW-UP VISIT: CPT | Performed by: OTOLARYNGOLOGY

## 2024-12-24 PROCEDURE — 3008F BODY MASS INDEX DOCD: CPT | Performed by: OTOLARYNGOLOGY

## 2024-12-24 NOTE — PROGRESS NOTES
Subjective   Patient ID: Nell Almodovar is a 29 y.o. female  HPI  Patient presents for follow-up status post septoplasty and bilateral turbinoplasty.  She is very happy with the nasal breathing.  Review of Systems    Objective   Physical Exam  There are large clots and inferior turbinates bilaterally and they were cleared.  The septum is midline.  There is no bleeding or hematoma or infection.    Assessment/Plan   Diagnoses and all orders for this visit:  Deviated nasal septum (Primary)  Hypertrophy of nasal turbinates     Doing well status post septoplasty and bilateral turbinoplasty following debridement today.  She is happy with the result.  She was advised to continue the nasal saline sprays for the next 6 weeks and she will follow-up as needed.

## 2025-01-03 DIAGNOSIS — Z30.41 ENCOUNTER FOR SURVEILLANCE OF CONTRACEPTIVE PILLS: ICD-10-CM

## 2025-01-03 RX ORDER — NORETHINDRONE ACETATE AND ETHINYL ESTRADIOL 1MG-20(24)
1 KIT ORAL DAILY
Qty: 84 TABLET | Refills: 0 | Status: SHIPPED | OUTPATIENT
Start: 2025-01-03

## 2025-01-10 ENCOUNTER — APPOINTMENT (OUTPATIENT)
Dept: PRIMARY CARE | Facility: CLINIC | Age: 30
End: 2025-01-10
Payer: COMMERCIAL

## 2025-01-13 ENCOUNTER — APPOINTMENT (OUTPATIENT)
Dept: PRIMARY CARE | Facility: CLINIC | Age: 30
End: 2025-01-13
Payer: COMMERCIAL

## 2025-01-20 ENCOUNTER — APPOINTMENT (OUTPATIENT)
Dept: PRIMARY CARE | Facility: CLINIC | Age: 30
End: 2025-01-20
Payer: COMMERCIAL

## 2025-01-20 VITALS
OXYGEN SATURATION: 98 % | HEIGHT: 65 IN | SYSTOLIC BLOOD PRESSURE: 102 MMHG | DIASTOLIC BLOOD PRESSURE: 70 MMHG | HEART RATE: 94 BPM | WEIGHT: 130.2 LBS | BODY MASS INDEX: 21.69 KG/M2

## 2025-01-20 DIAGNOSIS — F90.9 ATTENTION DEFICIT HYPERACTIVITY DISORDER (ADHD), UNSPECIFIED ADHD TYPE: ICD-10-CM

## 2025-01-20 DIAGNOSIS — Z87.898 HISTORY OF PREDIABETES: ICD-10-CM

## 2025-01-20 DIAGNOSIS — Z00.00 HEALTHCARE MAINTENANCE: Primary | ICD-10-CM

## 2025-01-20 LAB
AMPHETAMINES UR QL SCN: NORMAL
BARBITURATES UR QL SCN: NORMAL
BENZODIAZ UR QL SCN: NORMAL
BZE UR QL SCN: NORMAL
CANNABINOIDS UR QL SCN: NORMAL
FENTANYL+NORFENTANYL UR QL SCN: NORMAL
METHADONE UR QL SCN: NORMAL
OPIATES UR QL SCN: NORMAL
OXYCODONE+OXYMORPHONE UR QL SCN: NORMAL
PCP UR QL SCN: NORMAL

## 2025-01-20 PROCEDURE — 1036F TOBACCO NON-USER: CPT | Performed by: STUDENT IN AN ORGANIZED HEALTH CARE EDUCATION/TRAINING PROGRAM

## 2025-01-20 PROCEDURE — 3008F BODY MASS INDEX DOCD: CPT | Performed by: STUDENT IN AN ORGANIZED HEALTH CARE EDUCATION/TRAINING PROGRAM

## 2025-01-20 PROCEDURE — 99214 OFFICE O/P EST MOD 30 MIN: CPT | Performed by: STUDENT IN AN ORGANIZED HEALTH CARE EDUCATION/TRAINING PROGRAM

## 2025-01-20 PROCEDURE — 99395 PREV VISIT EST AGE 18-39: CPT | Performed by: STUDENT IN AN ORGANIZED HEALTH CARE EDUCATION/TRAINING PROGRAM

## 2025-01-20 RX ORDER — DEXTROAMPHETAMINE SACCHARATE, AMPHETAMINE ASPARTATE MONOHYDRATE, DEXTROAMPHETAMINE SULFATE AND AMPHETAMINE SULFATE 3.75; 3.75; 3.75; 3.75 MG/1; MG/1; MG/1; MG/1
15 CAPSULE, EXTENDED RELEASE ORAL EVERY MORNING
Qty: 30 CAPSULE | Refills: 0 | Status: CANCELLED | OUTPATIENT
Start: 2025-01-20

## 2025-01-20 RX ORDER — DEXTROAMPHETAMINE SACCHARATE, AMPHETAMINE ASPARTATE MONOHYDRATE, DEXTROAMPHETAMINE SULFATE AND AMPHETAMINE SULFATE 5; 5; 5; 5 MG/1; MG/1; MG/1; MG/1
20 CAPSULE, EXTENDED RELEASE ORAL EVERY MORNING
Qty: 30 CAPSULE | Refills: 0 | Status: SHIPPED | OUTPATIENT
Start: 2025-02-19

## 2025-01-20 RX ORDER — DEXTROAMPHETAMINE SACCHARATE, AMPHETAMINE ASPARTATE MONOHYDRATE, DEXTROAMPHETAMINE SULFATE AND AMPHETAMINE SULFATE 3.75; 3.75; 3.75; 3.75 MG/1; MG/1; MG/1; MG/1
15 CAPSULE, EXTENDED RELEASE ORAL EVERY MORNING
Qty: 30 CAPSULE | Refills: 0 | Status: SHIPPED | OUTPATIENT
Start: 2025-01-20

## 2025-01-20 ASSESSMENT — PATIENT HEALTH QUESTIONNAIRE - PHQ9
2. FEELING DOWN, DEPRESSED OR HOPELESS: NOT AT ALL
1. LITTLE INTEREST OR PLEASURE IN DOING THINGS: NOT AT ALL
SUM OF ALL RESPONSES TO PHQ9 QUESTIONS 1 AND 2: 0

## 2025-01-20 NOTE — PROGRESS NOTES
Subjective   Patient ID: Nell Almodovar is a 29 y.o. female who presents for annual wellness visit  Today she is accompanied by alone.     HPI  Health maintenance  Overall patient is doing well.    Immunization: Tdap 2020, HPV completed 3 doses.  Influenza vaccine declined  COVID-19 vaccine (Pfizer Moderna) 2 doses:2/2021 and 3/2021  Colon Cancer Screening: No family history, will resume screening at age 47  OB/GYN:  Pap smear in 11/2023 was negative with 3 year clearance, not due till 2026  Diet: Regular well-balanced diet  Exercise: Exercises regularly 3-4 times a week, with weight lifting.   Tobacco: Denies use  EtOH: Rarely Socially      2.  ADHD  Patient was diagnosed by Dr. Stewart with ADHD due to forgetfulness and low level of concentration, low motivation.   Patient is currently on adderall XR 15 mg daily. Does believe it is helpful but finds it not working towards the evening. Would like to discuss increasing her dose.   In addition, she has been off of her medication for 3 weeks due to the holidays and her septum surgery  Denied palpitation, weight changes, chest pain, tremor, low appetite.   Denied cardiac problems.   Request medication refill today.    3.  History of prediabetes  Patient was found to have prediabetes 9 months ago, did have a repeat A1c which was within normal limits.  Willing to repeat her A1c      Current Outpatient Medications on File Prior to Visit   Medication Sig Dispense Refill    amphetamine-dextroamphetamine XR (Adderall XR) 15 mg 24 hr capsule Take 1 capsule (15 mg) by mouth once daily in the morning. Do not crush or chew. Do not fill before October 7, 2024. 30 capsule 0    Blisovi 24 Fe 1 mg-20 mcg (24)/75 mg (4) tablet TAKE 1 TABLET BY MOUTH EVERY DAY 84 tablet 0    HYDROcodone-acetaminophen (Norco) 7.5-325 mg tablet Take 1 tablet by mouth every 6 hours if needed for severe pain (7 - 10). 8 tablet 0    [DISCONTINUED] amphetamine-dextroamphetamine XR (Adderall XR) 15 mg 24 hr  "capsule Take 1 capsule (15 mg) by mouth once daily in the morning. Do not crush or chew. 30 capsule 0    [DISCONTINUED] amphetamine-dextroamphetamine XR (Adderall XR) 15 mg 24 hr capsule Take 1 capsule (15 mg) by mouth once daily in the morning. Do not crush or chew. Do not fill before September 7, 2024. 30 capsule 0     No current facility-administered medications on file prior to visit.        Allergies   Allergen Reactions    Amoxicillin Hives     When I was little I think I had a rash    Milk Containing Products (Dairy) Hives       Immunization History   Administered Date(s) Administered    COVID-19, mRNA, LNP-S, PF, 30 mcg/0.3 mL dose 02/25/2021, 03/18/2021    DTaP, Unspecified 10/30/1998    HPV 9-valent vaccine (GARDASIL 9) 08/18/2015, 12/30/2015    HPV, Quadrivalent 07/08/2015    Hepatitis B vaccine, 19 yrs and under (RECOMBIVAX, ENGERIX) 07/08/2015, 08/18/2015    Meningococcal ACWY-D (Menactra) 4-valent conjugate vaccine 07/08/2015    PPD Test 04/29/2015    Tdap vaccine, age 7 year and older (BOOSTRIX, ADACEL) 07/08/2015, 08/24/2020    Varicella vaccine, subcutaneous (VARIVAX) 07/08/2015         Review of Systems  All pertinent positive symptoms are included in the history of present illness.  All other systems have been reviewed and are negative and noncontributory to this patient's current ailments.     Objective   /70 (BP Location: Left arm, Patient Position: Sitting, BP Cuff Size: Adult)   Pulse 94   Ht 1.651 m (5' 5\")   Wt 59.1 kg (130 lb 3.2 oz)   SpO2 98%   BMI 21.67 kg/m²   BSA: 1.65 meters squared  No visits with results within 1 Month(s) from this visit.   Latest known visit with results is:   Admission on 12/06/2024, Discharged on 12/06/2024   Component Date Value Ref Range Status    Preg Test, Ur 12/06/2024 Negative  Negative Final       Physical Exam  CONSTITUTIONAL - well nourished, well developed, looks like stated age, in no acute distress  HEAD - no trauma, normocephalic  EYES - " pupils are equal and reactive to light, extraocular muscles are intact, and normal external exam  ENT - uvula midline, normal tongue movement, throat without erythema or exudate, nasal passage without discharge and patent  NECK - supple without rigidity, no neck mass was observed, no thyromegaly   CHEST - clear to auscultation, no wheezing, no crackles and no rales, good effort  CARDIAC - regular rhythm and rate, no murmurs, normal S1 and S2  ABDOMEN - no organomegaly, soft, nontender, nondistended, normal bowel sounds, no guarding/rebound/rigidity  EXTREMITIES - no edema, no deformities  NEUROLOGICAL - normal gait, normal balance, normal motor, no ataxia, alert and oriented x3   PSYCHIATRIC - alert, pleasant and cordial, age-appropriate    Assessment/Plan   Diagnoses and all orders for this visit:  Healthcare maintenance  Complete history and physical examination was performed, with normal finding  Requisition for CBC, CMP, TSH, lipid panel, A1c, and UA was provided today  We will notify of test results once available and make treatment recommendations accordingly     2.  ADHD  A refill of your medication was sent to the pharmacy.  We will refill 1 month of your 15 mg dose as you have been off of your medications for 3 weeks.  In addition we will also send a 1 month 20 mg dose.  Please send us a message about how you tolerate this dose and we will send you another 1 month supply at that time.  UDS and CSA were updated today.    3.  History of prediabetes  We will recheck A1c.  We will notify you with the results and make recommendations accordingly       Thank you for letting us be a part of your care team.  Please call the office if you have further questions or concerns regarding your care

## 2025-03-23 DIAGNOSIS — F90.9 ATTENTION DEFICIT HYPERACTIVITY DISORDER (ADHD), UNSPECIFIED ADHD TYPE: ICD-10-CM

## 2025-03-23 DIAGNOSIS — Z30.41 ENCOUNTER FOR SURVEILLANCE OF CONTRACEPTIVE PILLS: ICD-10-CM

## 2025-03-24 RX ORDER — DEXTROAMPHETAMINE SACCHARATE, AMPHETAMINE ASPARTATE MONOHYDRATE, DEXTROAMPHETAMINE SULFATE AND AMPHETAMINE SULFATE 5; 5; 5; 5 MG/1; MG/1; MG/1; MG/1
20 CAPSULE, EXTENDED RELEASE ORAL EVERY MORNING
Qty: 30 CAPSULE | Refills: 0 | Status: SHIPPED | OUTPATIENT
Start: 2025-03-24

## 2025-03-24 RX ORDER — NORETHINDRONE ACETATE AND ETHINYL ESTRADIOL 1MG-20(24)
1 KIT ORAL DAILY
Qty: 84 TABLET | Refills: 0 | Status: SHIPPED | OUTPATIENT
Start: 2025-03-24

## 2025-05-14 ENCOUNTER — APPOINTMENT (OUTPATIENT)
Dept: PRIMARY CARE | Facility: CLINIC | Age: 30
End: 2025-05-14
Payer: COMMERCIAL

## 2025-05-14 VITALS
HEART RATE: 78 BPM | SYSTOLIC BLOOD PRESSURE: 110 MMHG | DIASTOLIC BLOOD PRESSURE: 70 MMHG | WEIGHT: 127.6 LBS | OXYGEN SATURATION: 97 % | BODY MASS INDEX: 21.23 KG/M2

## 2025-05-14 DIAGNOSIS — F90.9 ATTENTION DEFICIT HYPERACTIVITY DISORDER (ADHD), UNSPECIFIED ADHD TYPE: ICD-10-CM

## 2025-05-14 PROCEDURE — 1036F TOBACCO NON-USER: CPT | Performed by: STUDENT IN AN ORGANIZED HEALTH CARE EDUCATION/TRAINING PROGRAM

## 2025-05-14 PROCEDURE — 99213 OFFICE O/P EST LOW 20 MIN: CPT | Performed by: STUDENT IN AN ORGANIZED HEALTH CARE EDUCATION/TRAINING PROGRAM

## 2025-05-14 RX ORDER — DEXTROAMPHETAMINE SACCHARATE, AMPHETAMINE ASPARTATE MONOHYDRATE, DEXTROAMPHETAMINE SULFATE AND AMPHETAMINE SULFATE 5; 5; 5; 5 MG/1; MG/1; MG/1; MG/1
20 CAPSULE, EXTENDED RELEASE ORAL EVERY MORNING
Qty: 30 CAPSULE | Refills: 0 | Status: SHIPPED | OUTPATIENT
Start: 2025-06-13

## 2025-05-14 RX ORDER — DEXTROAMPHETAMINE SACCHARATE, AMPHETAMINE ASPARTATE MONOHYDRATE, DEXTROAMPHETAMINE SULFATE AND AMPHETAMINE SULFATE 5; 5; 5; 5 MG/1; MG/1; MG/1; MG/1
20 CAPSULE, EXTENDED RELEASE ORAL EVERY MORNING
Qty: 30 CAPSULE | Refills: 0 | Status: SHIPPED | OUTPATIENT
Start: 2025-05-14

## 2025-05-14 RX ORDER — DEXTROAMPHETAMINE SACCHARATE, AMPHETAMINE ASPARTATE MONOHYDRATE, DEXTROAMPHETAMINE SULFATE AND AMPHETAMINE SULFATE 5; 5; 5; 5 MG/1; MG/1; MG/1; MG/1
20 CAPSULE, EXTENDED RELEASE ORAL EVERY MORNING
Qty: 30 CAPSULE | Refills: 0 | Status: SHIPPED | OUTPATIENT
Start: 2025-07-13

## 2025-05-14 NOTE — PROGRESS NOTES
Subjective   Patient ID: Nell Almodovar is a 30 y.o. female who presents for a 3-month follow-up  Today she is accompanied by alone.     HPI   ADHD  Patient was diagnosed by Dr. Stewart with ADHD due to forgetfulness and low level of concentration, low motivation.  This was quite sometime ago  Ever since she continues to be on Adderall XR as indicated the chart  Recently we did increase to 20 mg once daily  Feels very stable with this at this time  Denies any palpitations, or any other concerns involving medication  UDS/CSA up-to-date and requesting refills          Current Outpatient Medications on File Prior to Visit   Medication Sig Dispense Refill    Blisovi 24 Fe 1 mg-20 mcg (24)/75 mg (4) tablet TAKE 1 TABLET BY MOUTH EVERY DAY 84 tablet 0    [DISCONTINUED] amphetamine-dextroamphetamine XR (Adderall XR) 15 mg 24 hr capsule Take 1 capsule (15 mg) by mouth once daily in the morning. Do not crush or chew. Do not fill before October 7, 2024. 30 capsule 0    [DISCONTINUED] amphetamine-dextroamphetamine XR (Adderall XR) 15 mg 24 hr capsule Take 1 capsule (15 mg) by mouth once daily in the morning. Do not crush or chew. 30 capsule 0    [DISCONTINUED] amphetamine-dextroamphetamine XR (Adderall XR) 20 mg 24 hr capsule Take 1 capsule (20 mg) by mouth once daily in the morning. Do not crush or chew. 30 capsule 0    [DISCONTINUED] HYDROcodone-acetaminophen (Norco) 7.5-325 mg tablet Take 1 tablet by mouth every 6 hours if needed for severe pain (7 - 10). 8 tablet 0     No current facility-administered medications on file prior to visit.        Allergies   Allergen Reactions    Amoxicillin Hives     When I was little I think I had a rash    Milk Containing Products (Dairy) Hives       Immunization History   Administered Date(s) Administered    COVID-19, mRNA, LNP-S, PF, 30 mcg/0.3 mL dose 02/25/2021, 03/18/2021    DTaP, Unspecified 10/30/1998    HPV 9-valent vaccine (GARDASIL 9) 08/18/2015, 12/30/2015    HPV, Quadrivalent  07/08/2015    Hepatitis B vaccine, 19 yrs and under (RECOMBIVAX, ENGERIX) 07/08/2015, 08/18/2015    Meningococcal ACWY-D (Menactra) 4-valent conjugate vaccine 07/08/2015    PPD Test 04/29/2015    Tdap vaccine, age 7 year and older (BOOSTRIX, ADACEL) 07/08/2015, 08/24/2020    Varicella vaccine, subcutaneous (VARIVAX) 07/08/2015         Review of Systems  All pertinent positive symptoms are included in the history of present illness.  All other systems have been reviewed and are negative and noncontributory to this patient's current ailments.     Objective   /70 (BP Location: Left arm, Patient Position: Sitting, BP Cuff Size: Adult)   Pulse 78   Wt 57.9 kg (127 lb 9.6 oz)   SpO2 97%   BMI 21.23 kg/m²   BSA: 1.63 meters squared  No visits with results within 1 Month(s) from this visit.   Latest known visit with results is:   Office Visit on 01/20/2025   Component Date Value Ref Range Status    Amphetamine Screen, Urine 01/20/2025 Presumptive Negative  Presumptive Negative Final    CUTOFF LEVEL: 500 NG/ML   Cross-reactivity has been reported with high concentrations   of the following drugs: buproprion, chloroquine, chlorpromazine,   ephedrine, mephentermine, fenfluramine, phentermine,   phenylpropanolamine, pseudoephedrine, and propranolol.    Barbiturate Screen, Urine 01/20/2025 Presumptive Negative  Presumptive Negative Final    CUTOFF LEVEL: 200 NG/ML    Benzodiazepines Screen, Urine 01/20/2025 Presumptive Negative  Presumptive Negative Final    CUTOFF LEVEL: 200 NG/ML    Cannabinoid Screen, Urine 01/20/2025 Presumptive Negative  Presumptive Negative Final    CUTOFF LEVEL: 50 NG/ML    Cocaine Metabolite Screen, Urine 01/20/2025 Presumptive Negative  Presumptive Negative Final    CUTOFF LEVEL: 150 NG/ML    Fentanyl Screen, Urine 01/20/2025 Presumptive Negative  Presumptive Negative Final    CUTOFF LEVEL: 5 NG/ML    Opiate Screen, Urine 01/20/2025 Presumptive Negative  Presumptive Negative Final    CUTOFF  LEVEL: 300 NG/ML  The opiate screen does not detect fentanyl, meperidine, or   tramadol. Oxycodone is not consistently detected (refer to  Oxycodone Screen, Urine result).    Oxycodone Screen, Urine 01/20/2025 Presumptive Negative  Presumptive Negative Final    CUTOFF LEVEL: 100 NG/ML  This test will accurately detect both oxycodone and oxymorphone.    PCP Screen, Urine 01/20/2025 Presumptive Negative  Presumptive Negative Final    CUTOFF LEVEL:  25 NG/ML  Cross-reactivity has been reported with dextromethorphan.    Methadone Screen, Urine 01/20/2025 Presumptive Negative  Presumptive Negative Final    CUTOFF LEVEL: 150 NG/ML  The metabolite L-alpha-acetylmethadol (LAAM) is not  detected by this method in concentrations that would  be found in the urine of patients on LAAM therapy.    Methamphetamine Quant, Ur 01/20/2025 <200  ng/mL Final    MDA, Urine 01/20/2025 <200  ng/mL Final    MDEA, Urine 01/20/2025 <200  ng/mL Final    Phentermine,Urine 01/20/2025 <200  ng/mL Final    Performed By: [facility]  [address] [place] [address]  : Agapito Chappell MD, PhD  CLIA Number: [id number]    Amphetamines,Urine 01/20/2025 <50  ng/mL Final    INTERPRETIVE INFORMATION: Amphetamines, Urine,                             Quantitative    Methodology: Quantitative Liquid Chromatography-Tandem Mass   Spectrometry    Positive cutoff: 200 ng/mL unless specified below:  Amphetamine     50 ng/mL    For medical purposes only; not valid for forensic use.     The absence of expected drug(s) and/or drug metabolite(s) may   indicate non-compliance, inappropriate timing of specimen   collection relative to drug administration, poor drug absorption,   diluted/adulterated urine, or limitations of testing. The   concentration value must be greater than or equal to the cutoff to   be reported as positive. Interpretive questions should be directed   to the laboratory.    This test was developed and its  performance characteristics   determined by [facility]. It has not been cleared or   approved by the US Food and Drug Administration. This test was   performed in a CLIA certified laboratory and is intended for   clinical purposes.    MDMA, Urine 01/20/2025 <200  ng/mL Final       Physical Exam  CONSTITUTIONAL - well nourished, well developed, looks like stated age, in no acute distress, not ill-appearing, and not tired appearing  SKIN - normal skin color and pigmentation, normal skin turgor without rash, lesions, or nodules visualized  HEAD - no trauma, normocephalic  EYES - normal external exam  CHEST -no distressed breathing, good effort  EXTREMITIES - no edema, no deformities  NEUROLOGICAL - normal balance, normal motor, no ataxia  PSYCHIATRIC - alert, pleasant and cordial, age-appropriate    Assessment/Plan      ADHD  We will continue at the 20 mg XR dosage  Stable without any changes recommended  UDS/CSA up-to-date  Please follow-up in 3 months for continued care and refills      Thank you for letting us be a part of your care team.  Please call the office if you have further questions or concerns regarding your care    This note was initiated by a medical student.  I was present with the medical student who participated in the documentation of this note.  I have personally seen and examined the patient and performed the medical decision-making components.   I have reviewed the medical student documentation and verified the findings in the note as written with additions or exceptions as stated in the body of the note.

## 2025-06-13 DIAGNOSIS — Z30.41 ENCOUNTER FOR SURVEILLANCE OF CONTRACEPTIVE PILLS: ICD-10-CM

## 2025-06-13 RX ORDER — NORETHINDRONE ACETATE AND ETHINYL ESTRADIOL 1MG-20(24)
1 KIT ORAL DAILY
Qty: 84 TABLET | Refills: 1 | Status: SHIPPED | OUTPATIENT
Start: 2025-06-13

## 2025-07-03 ENCOUNTER — APPOINTMENT (OUTPATIENT)
Dept: PRIMARY CARE | Facility: CLINIC | Age: 30
End: 2025-07-03
Payer: COMMERCIAL

## 2025-07-03 VITALS
OXYGEN SATURATION: 96 % | WEIGHT: 130 LBS | SYSTOLIC BLOOD PRESSURE: 116 MMHG | BODY MASS INDEX: 21.63 KG/M2 | DIASTOLIC BLOOD PRESSURE: 70 MMHG | HEART RATE: 70 BPM

## 2025-07-03 DIAGNOSIS — F90.9 ATTENTION DEFICIT HYPERACTIVITY DISORDER (ADHD), UNSPECIFIED ADHD TYPE: ICD-10-CM

## 2025-07-03 DIAGNOSIS — Z30.41 ENCOUNTER FOR SURVEILLANCE OF CONTRACEPTIVE PILLS: ICD-10-CM

## 2025-07-03 PROCEDURE — 99213 OFFICE O/P EST LOW 20 MIN: CPT | Performed by: STUDENT IN AN ORGANIZED HEALTH CARE EDUCATION/TRAINING PROGRAM

## 2025-07-03 PROCEDURE — 1036F TOBACCO NON-USER: CPT | Performed by: STUDENT IN AN ORGANIZED HEALTH CARE EDUCATION/TRAINING PROGRAM

## 2025-07-03 RX ORDER — DEXTROAMPHETAMINE SACCHARATE, AMPHETAMINE ASPARTATE MONOHYDRATE, DEXTROAMPHETAMINE SULFATE AND AMPHETAMINE SULFATE 5; 5; 5; 5 MG/1; MG/1; MG/1; MG/1
20 CAPSULE, EXTENDED RELEASE ORAL EVERY MORNING
Qty: 30 CAPSULE | Refills: 0 | Status: SHIPPED | OUTPATIENT
Start: 2025-07-03

## 2025-07-03 RX ORDER — NORETHINDRONE ACETATE AND ETHINYL ESTRADIOL 1MG-20(24)
1 KIT ORAL DAILY
Qty: 84 TABLET | Refills: 1 | Status: CANCELLED | OUTPATIENT
Start: 2025-07-03

## 2025-07-03 RX ORDER — DEXTROAMPHETAMINE SACCHARATE, AMPHETAMINE ASPARTATE MONOHYDRATE, DEXTROAMPHETAMINE SULFATE AND AMPHETAMINE SULFATE 5; 5; 5; 5 MG/1; MG/1; MG/1; MG/1
20 CAPSULE, EXTENDED RELEASE ORAL EVERY MORNING
Qty: 30 CAPSULE | Refills: 0 | Status: SHIPPED | OUTPATIENT
Start: 2025-09-01

## 2025-07-03 RX ORDER — DEXTROAMPHETAMINE SACCHARATE, AMPHETAMINE ASPARTATE MONOHYDRATE, DEXTROAMPHETAMINE SULFATE AND AMPHETAMINE SULFATE 5; 5; 5; 5 MG/1; MG/1; MG/1; MG/1
20 CAPSULE, EXTENDED RELEASE ORAL EVERY MORNING
Qty: 30 CAPSULE | Refills: 0 | Status: SHIPPED | OUTPATIENT
Start: 2025-08-02

## 2025-07-03 ASSESSMENT — PATIENT HEALTH QUESTIONNAIRE - PHQ9
1. LITTLE INTEREST OR PLEASURE IN DOING THINGS: NOT AT ALL
SUM OF ALL RESPONSES TO PHQ9 QUESTIONS 1 AND 2: 0
2. FEELING DOWN, DEPRESSED OR HOPELESS: NOT AT ALL

## 2025-07-03 NOTE — PROGRESS NOTES
Subjective   Patient ID: Nell Almodovar is a 30 y.o. female who presents for a 3-month follow-up  Today she is accompanied by alone.     HPI   ADHD  Patient was diagnosed by Dr. Stewart with ADHD due to forgetfulness and low level of concentration, low motivation.  This was quite sometime ago  Ever since she continues to be on Adderall XR as indicated the chart  Recently we did increase to 20 mg once daily  Feels very stable with this at this time  Denies any palpitations, or any other concerns involving medication  UDS/CSA up-to-date and requesting refills            Current Outpatient Medications on File Prior to Visit   Medication Sig Dispense Refill    Blisovi 24 Fe 1 mg-20 mcg (24)/75 mg (4) tablet TAKE 1 TABLET BY MOUTH EVERY DAY 84 tablet 1    [DISCONTINUED] amphetamine-dextroamphetamine XR (Adderall XR) 20 mg 24 hr capsule Take 1 capsule (20 mg) by mouth once daily in the morning. Do not crush or chew. 30 capsule 0    [DISCONTINUED] amphetamine-dextroamphetamine XR (Adderall XR) 20 mg 24 hr capsule Take 1 capsule (20 mg) by mouth once daily in the morning. Do not crush or chew. Do not fill before June 13, 2025. 30 capsule 0    [DISCONTINUED] amphetamine-dextroamphetamine XR (Adderall XR) 20 mg 24 hr capsule Take 1 capsule (20 mg) by mouth once daily in the morning. Do not crush or chew. Do not fill before July 13, 2025. 30 capsule 0     No current facility-administered medications on file prior to visit.        Allergies   Allergen Reactions    Amoxicillin Hives     When I was little I think I had a rash    Milk Containing Products (Dairy) Hives       Immunization History   Administered Date(s) Administered    COVID-19, mRNA, LNP-S, PF, 30 mcg/0.3 mL dose 02/25/2021, 03/18/2021    DTaP, Unspecified 10/30/1998    HPV 9-valent vaccine (GARDASIL 9) 08/18/2015, 12/30/2015    HPV, Quadrivalent 07/08/2015    Hepatitis B vaccine, 19 yrs and under (RECOMBIVAX, ENGERIX) 07/08/2015, 08/18/2015    Meningococcal ACWY-D  (Menactra) 4-valent conjugate vaccine 07/08/2015    PPD Test 04/29/2015    Tdap vaccine, age 7 year and older (BOOSTRIX, ADACEL) 07/08/2015, 08/24/2020    Varicella vaccine, subcutaneous (VARIVAX) 07/08/2015         Review of Systems  All pertinent positive symptoms are included in the history of present illness.  All other systems have been reviewed and are negative and noncontributory to this patient's current ailments.     Objective   /70 (BP Location: Left arm, Patient Position: Sitting, BP Cuff Size: Adult)   Pulse 70   Wt 59 kg (130 lb)   SpO2 96%   BMI 21.63 kg/m²   BSA: 1.64 meters squared  No visits with results within 1 Month(s) from this visit.   Latest known visit with results is:   Office Visit on 01/20/2025   Component Date Value Ref Range Status    Amphetamine Screen, Urine 01/20/2025 Presumptive Negative  Presumptive Negative Final    CUTOFF LEVEL: 500 NG/ML   Cross-reactivity has been reported with high concentrations   of the following drugs: buproprion, chloroquine, chlorpromazine,   ephedrine, mephentermine, fenfluramine, phentermine,   phenylpropanolamine, pseudoephedrine, and propranolol.    Barbiturate Screen, Urine 01/20/2025 Presumptive Negative  Presumptive Negative Final    CUTOFF LEVEL: 200 NG/ML    Benzodiazepines Screen, Urine 01/20/2025 Presumptive Negative  Presumptive Negative Final    CUTOFF LEVEL: 200 NG/ML    Cannabinoid Screen, Urine 01/20/2025 Presumptive Negative  Presumptive Negative Final    CUTOFF LEVEL: 50 NG/ML    Cocaine Metabolite Screen, Urine 01/20/2025 Presumptive Negative  Presumptive Negative Final    CUTOFF LEVEL: 150 NG/ML    Fentanyl Screen, Urine 01/20/2025 Presumptive Negative  Presumptive Negative Final    CUTOFF LEVEL: 5 NG/ML    Opiate Screen, Urine 01/20/2025 Presumptive Negative  Presumptive Negative Final    CUTOFF LEVEL: 300 NG/ML  The opiate screen does not detect fentanyl, meperidine, or   tramadol. Oxycodone is not consistently detected  (refer to  Oxycodone Screen, Urine result).    Oxycodone Screen, Urine 01/20/2025 Presumptive Negative  Presumptive Negative Final    CUTOFF LEVEL: 100 NG/ML  This test will accurately detect both oxycodone and oxymorphone.    PCP Screen, Urine 01/20/2025 Presumptive Negative  Presumptive Negative Final    CUTOFF LEVEL:  25 NG/ML  Cross-reactivity has been reported with dextromethorphan.    Methadone Screen, Urine 01/20/2025 Presumptive Negative  Presumptive Negative Final    CUTOFF LEVEL: 150 NG/ML  The metabolite L-alpha-acetylmethadol (LAAM) is not  detected by this method in concentrations that would  be found in the urine of patients on LAAM therapy.    Methamphetamine Quant, Ur 01/20/2025 <200  ng/mL Final    MDA, Urine 01/20/2025 <200  ng/mL Final    MDEA, Urine 01/20/2025 <200  ng/mL Final    Phentermine,Urine 01/20/2025 <200  ng/mL Final    Performed By: [facility]  [address] [place] [address]  : Agapito Chappell MD, PhD  CLIA Number: [id number]    Amphetamines,Urine 01/20/2025 <50  ng/mL Final    INTERPRETIVE INFORMATION: Amphetamines, Urine,                             Quantitative    Methodology: Quantitative Liquid Chromatography-Tandem Mass   Spectrometry    Positive cutoff: 200 ng/mL unless specified below:  Amphetamine     50 ng/mL    For medical purposes only; not valid for forensic use.     The absence of expected drug(s) and/or drug metabolite(s) may   indicate non-compliance, inappropriate timing of specimen   collection relative to drug administration, poor drug absorption,   diluted/adulterated urine, or limitations of testing. The   concentration value must be greater than or equal to the cutoff to   be reported as positive. Interpretive questions should be directed   to the laboratory.    This test was developed and its performance characteristics   determined by [facility]. It has not been cleared or   approved by the [place] Food and Drug  Administration. This test was   performed in a CLIA certified laboratory and is intended for   clinical purposes.    MDMA, Urine 01/20/2025 <200  ng/mL Final       Physical Exam  CONSTITUTIONAL - well nourished, well developed, looks like stated age, in no acute distress, not ill-appearing, and not tired appearing  SKIN - normal skin color and pigmentation, normal skin turgor without rash, lesions, or nodules visualized  HEAD - no trauma, normocephalic  EYES - normal external exam  CHEST -no distressed breathing, good effort  EXTREMITIES - no edema, no deformities  NEUROLOGICAL - normal balance, normal motor, no ataxia  PSYCHIATRIC - alert, pleasant and cordial, age-appropriate    Assessment/Plan      ADHD  We will continue at the 20 mg XR dosage  Stable without any changes recommended  UDS/CSA up-to-date  Please follow-up in 3 months for continued care and refills

## (undated) DEVICE — CONTAINER, SPECIMEN, 120ML

## (undated) DEVICE — CORD, CAUTERY, BIOPOLAR FORCEP, 12FT

## (undated) DEVICE — DRAPE, TIBURON, SPLIT SHEET, REINF ADH STRIP, 77X122

## (undated) DEVICE — Device

## (undated) DEVICE — TUBING, SUCTION, CONNECTING, NON-CONDUCTIVE, SURE GRIP CONNECTORS, 3/16 IN X 10 FT

## (undated) DEVICE — PACKING, NASAL, LAMINATED, W/STRING, MEROCEL, 8 CM

## (undated) DEVICE — KNIFE, ENT, MICROSURGICAL, INFERIOR TURBINATE, 2.9 MM

## (undated) DEVICE — SUTURE, CHROMIC 4-0 RB1

## (undated) DEVICE — SPLINT, SILICONE

## (undated) DEVICE — GLOVE, SURGICAL, PROTEXIS PI BLUE W/NEUTHERA, 7.5, PF, LF

## (undated) DEVICE — DRAPE PACK, MINOR, CUSTOM, GEAUGA

## (undated) DEVICE — SUTURE, PROLENE, 2-0, 30 IN, KS, BLUE

## (undated) DEVICE — SYRINGE, HYPODERMIC, CONTROL, LUER LOCK, 10 CC, PLASTIC, STERILE

## (undated) DEVICE — PAD, EYE, OVAL, 1 5/8 X 2 5/8 IN, STERILE